# Patient Record
Sex: MALE | Race: WHITE | NOT HISPANIC OR LATINO | Employment: OTHER | URBAN - METROPOLITAN AREA
[De-identification: names, ages, dates, MRNs, and addresses within clinical notes are randomized per-mention and may not be internally consistent; named-entity substitution may affect disease eponyms.]

---

## 2017-03-17 ENCOUNTER — TELEPHONE (OUTPATIENT)
Dept: MEDICAL GROUP | Facility: MEDICAL CENTER | Age: 67
End: 2017-03-17

## 2017-03-17 NOTE — TELEPHONE ENCOUNTER
Future Appointments       Provider Department    4/4/2017 8:00 AM Cleveland Hoover M.D.; [facility]        ANNUAL WELLNESS VISIT PRE-VISIT PLANNING     1.  Reviewed last PCP office visit assessment and plan notes: Yes    2.  If any orders were placed last visit do we have Results/Consult Notes?        •  Labs? Yes Pt. Will get the labs done prior to his chantell.       •  Imaging? Yes        •  Referrals? Yes GI Pt. Plans to schedule an chantell in the near future.    3.  Patient Care Coordination Note was updated with diagnosis information:  Note sent to the PCC note    4.  Patient is due for these Health Maintenance Topics:   Health Maintenance Due   Topic Date Due   • IMM ZOSTER VACCINE  11/06/2008   • IMM PNEUMOCOCCAL 65+ (ADULT) LOW/MEDIUM RISK SERIES (1 of 2 - PCV13) 11/06/2013   • IMM INFLUENZA (1) 09/01/2016   • Annual Wellness Visit  12/03/2016          •  SUE letter was faxed to:  Eye MD for [facility] exam records    5.  Immunizations were updated in [employer] using WebIZ?: Yes       •  Web Iz Recommendations:  Prevnar 13, td, shingles       •  Is patient due for Tdap/Shingles? Yes.  If yes, was patient alerted of copay? Yes    6.  Patient has:       •   Diabetes: No       •   COPD: No       •   CHF: HX: Chest Pains       •   Depression: No    7.  Updated Care Team with [employer] and all specialists?        •   Gait devices, O2, CPAP, etc: no        •   Eye professional: yes       •   Other specialists (GYN, cardiology, endo, etc): N\A    8.  Is patient in need of any refills prior to office visit? No       •    Separate refill encounter created?: N\A    9.  Patient was informed to arrive 15 min prior to their scheduled appointment and bring in their medication bottles? yes    10.  Patient was advised: “This is a free wellness visit. The provider will screen for medical conditions to help you stay healthy. If you have other concerns to address you may be asked to discuss these at a  separate visit or there may be an additional fee.”  Yes

## 2017-03-17 NOTE — Clinical Note
[facility]  Cleveland Hoover M.D.  [address]  [place] [address]  Fax: [phone]   Authorization for Release/Disclosure of   Protected Health Information   Name: XANDER YUAN : 1950 SSN: [id number]   Address: [address] [place] [address]  [place] [address] Phone:    [phone] (home)    I authorize the entity listed below to release/disclose the PHI below to:   [facility]/Cleveland Hoover M.D. and Cleveland Hoover M.D.   Provider or Entity Name:  [facility]   Address: Phone:[phone]    Fax:[phone]   Reason for request: continuity of care   Information to be released:    [  ] LAST COLONOSCOPY,  including any PATH REPORT and follow-up  [  ] LAST FIT/COLOGUARD RESULT [  ] LAST DEXA  [  ] LAST MAMMOGRAM  [  ] LAST PAP  [  ] LAST LABS [  ] RETINA EXAM REPORT  [  ] IMMUNIZATION RECORDS  [  ] Release all info      [  ] Check here and initial the line next to each item to release ALL health information INCLUDING  _____ Care and treatment for drug and / or alcohol abuse  _____ HIV testing, infection status, or AIDS  _____ Genetic Testing    DATES OF SERVICE OR TIME PERIOD TO BE DISCLOSED: _____________  I understand and acknowledge that:  * This Authorization may be revoked at any time by you in writing, except if your health information has already been used or disclosed.  * Your health information that will be used or disclosed as a result of you signing this authorization could be re-disclosed by the recipient. If this occurs, your re-disclosed health information may no longer be protected by State or Federal laws.  * You may refuse to sign this Authorization. Your refusal will not affect your ability to obtain treatment.  * This Authorization becomes effective upon signing and will  on (date) __________.      If no date is indicated, this Authorization will  one (1) year from the signature date.    Name: Xander Yuan    Signature:   Date:          3/17/2017       PLEASE FAX REQUESTED RECORDS BACK TO: [phone]

## 2017-03-28 ENCOUNTER — HOSPITAL ENCOUNTER (OUTPATIENT)
Dept: LAB | Facility: MEDICAL CENTER | Age: 67
End: 2017-03-28
Attending: FAMILY MEDICINE
Payer: MEDICARE

## 2017-03-28 DIAGNOSIS — M79.10 MYALGIA: ICD-10-CM

## 2017-03-28 DIAGNOSIS — R53.83 OTHER FATIGUE: ICD-10-CM

## 2017-03-28 DIAGNOSIS — E78.49 OTHER HYPERLIPIDEMIA: ICD-10-CM

## 2017-03-28 LAB
ALBUMIN SERPL BCP-MCNC: 4.2 G/DL (ref 3.2–4.9)
ALBUMIN/GLOB SERPL: 1.6 G/DL
ALP SERPL-CCNC: 43 U/L (ref 30–99)
ALT SERPL-CCNC: 21 U/L (ref 2–50)
ANION GAP SERPL CALC-SCNC: 7 MMOL/L (ref 0–11.9)
AST SERPL-CCNC: 24 U/L (ref 12–45)
BASOPHILS # BLD AUTO: 0.03 K/UL (ref 0–0.12)
BASOPHILS NFR BLD AUTO: 0.4 % (ref 0–1.8)
BILIRUB SERPL-MCNC: 0.8 MG/DL (ref 0.1–1.5)
BUN SERPL-MCNC: 27 MG/DL (ref 8–22)
CALCIUM SERPL-MCNC: 9.8 MG/DL (ref 8.5–10.5)
CHLORIDE SERPL-SCNC: 104 MMOL/L (ref 96–112)
CHOLEST SERPL-MCNC: 180 MG/DL (ref 100–199)
CK SERPL-CCNC: 118 U/L (ref 0–154)
CO2 SERPL-SCNC: 31 MMOL/L (ref 20–33)
CREAT SERPL-MCNC: 1.15 MG/DL (ref 0.5–1.4)
CRP SERPL HS-MCNC: 0.04 MG/DL (ref 0–0.75)
EOSINOPHIL # BLD: 0.29 K/UL (ref 0–0.51)
EOSINOPHIL NFR BLD AUTO: 4.1 % (ref 0–6.9)
ERYTHROCYTE [DISTWIDTH] IN BLOOD BY AUTOMATED COUNT: 44.1 FL (ref 35.9–50)
ERYTHROCYTE [SEDIMENTATION RATE] IN BLOOD BY WESTERGREN METHOD: 6 MM/HOUR (ref 0–20)
GLOBULIN SER CALC-MCNC: 2.6 G/DL (ref 1.9–3.5)
GLUCOSE SERPL-MCNC: 73 MG/DL (ref 65–99)
HCT VFR BLD AUTO: 47.4 % (ref 42–52)
HDLC SERPL-MCNC: 47 MG/DL
HGB BLD-MCNC: 16.1 G/DL (ref 14–18)
IMM GRANULOCYTES # BLD AUTO: 0.02 K/UL (ref 0–0.11)
IMM GRANULOCYTES NFR BLD AUTO: 0.3 % (ref 0–0.9)
LDLC SERPL CALC-MCNC: 112 MG/DL
LYMPHOCYTES # BLD: 2.52 K/UL (ref 1–4.8)
LYMPHOCYTES NFR BLD AUTO: 35.3 % (ref 22–41)
MCH RBC QN AUTO: 31.4 PG (ref 27–33)
MCHC RBC AUTO-ENTMCNC: 34 G/DL (ref 33.7–35.3)
MCV RBC AUTO: 92.6 FL (ref 81.4–97.8)
MONOCYTES # BLD: 0.57 K/UL (ref 0–0.85)
MONOCYTES NFR BLD AUTO: 8 % (ref 0–13.4)
NEUTROPHILS # BLD: 3.7 K/UL (ref 1.82–7.42)
NEUTROPHILS NFR BLD AUTO: 51.9 % (ref 44–72)
NRBC # BLD AUTO: 0 K/UL
NRBC BLD-RTO: 0 /100 WBC
PLATELET # BLD AUTO: 166 K/UL (ref 164–446)
PMV BLD AUTO: 10.6 FL (ref 9–12.9)
POTASSIUM SERPL-SCNC: 3.9 MMOL/L (ref 3.6–5.5)
PROT SERPL-MCNC: 6.8 G/DL (ref 6–8.2)
RBC # BLD AUTO: 5.12 M/UL (ref 4.7–6.1)
SODIUM SERPL-SCNC: 142 MMOL/L (ref 135–145)
TRIGL SERPL-MCNC: 106 MG/DL (ref 0–149)
TSH SERPL DL<=0.005 MIU/L-ACNC: 3.15 UIU/ML (ref 0.3–3.7)
WBC # BLD AUTO: 7.1 K/UL (ref 4.8–10.8)

## 2017-03-28 PROCEDURE — 36415 COLL VENOUS BLD VENIPUNCTURE: CPT

## 2017-03-28 PROCEDURE — 85025 COMPLETE CBC W/AUTO DIFF WBC: CPT

## 2017-03-28 PROCEDURE — 85652 RBC SED RATE AUTOMATED: CPT

## 2017-03-28 PROCEDURE — 80053 COMPREHEN METABOLIC PANEL: CPT

## 2017-03-28 PROCEDURE — 80061 LIPID PANEL: CPT

## 2017-03-28 PROCEDURE — 84443 ASSAY THYROID STIM HORMONE: CPT

## 2017-03-28 PROCEDURE — 82550 ASSAY OF CK (CPK): CPT

## 2017-03-28 PROCEDURE — 86140 C-REACTIVE PROTEIN: CPT

## 2017-04-04 ENCOUNTER — OFFICE VISIT (OUTPATIENT)
Dept: MEDICAL GROUP | Facility: MEDICAL CENTER | Age: 67
End: 2017-04-04
Payer: MEDICARE

## 2017-04-04 VITALS
DIASTOLIC BLOOD PRESSURE: 68 MMHG | WEIGHT: 181 LBS | HEART RATE: 43 BPM | BODY MASS INDEX: 24.52 KG/M2 | OXYGEN SATURATION: 96 % | TEMPERATURE: 98.4 F | SYSTOLIC BLOOD PRESSURE: 112 MMHG | RESPIRATION RATE: 18 BRPM | HEIGHT: 72 IN

## 2017-04-04 DIAGNOSIS — Z00.00 MEDICARE ANNUAL WELLNESS VISIT, SUBSEQUENT: ICD-10-CM

## 2017-04-04 DIAGNOSIS — G47.09 OTHER INSOMNIA: ICD-10-CM

## 2017-04-04 DIAGNOSIS — N18.30 CHRONIC KIDNEY DISEASE, STAGE III (MODERATE) (HCC): ICD-10-CM

## 2017-04-04 DIAGNOSIS — Z23 NEED FOR PNEUMOCOCCAL VACCINATION: ICD-10-CM

## 2017-04-04 PROCEDURE — G0439 PPPS, SUBSEQ VISIT: HCPCS | Performed by: FAMILY MEDICINE

## 2017-04-04 PROCEDURE — G8510 SCR DEP NEG, NO PLAN REQD: HCPCS | Performed by: FAMILY MEDICINE

## 2017-04-04 ASSESSMENT — PATIENT HEALTH QUESTIONNAIRE - PHQ9
SUM OF ALL RESPONSES TO PHQ QUESTIONS 1-9: 0
CLINICAL INTERPRETATION OF PHQ2 SCORE: 0

## 2017-04-04 NOTE — PROGRESS NOTES
Chief Complaint   Patient presents with   • Annual Wellness Visit         HPI:  Xander is a 66 y.o. male here for Medicare Annual Wellness Visit        Patient Active Problem List    Diagnosis Date Noted   • Impingement syndrome of right shoulder 12/06/2016   • Insomnia 12/03/2015   • Chronic kidney disease, stage III (moderate) 12/03/2015       Current Outpatient Prescriptions   Medication Sig Dispense Refill   • Oxycodone-Acetaminophen (PERCOCET PO) Take 1-2 Tabs by mouth at bedtime as needed (1-2 tab per night prn twice a week.).     • diphenhydramine (SLEEP AID, DIPHENHYDRAMINE,) 25 MG tablet Take 50 mg by mouth at bedtime as needed for Sleep.     • Naproxen Sod-Diphenhydramine (ALEVE PM PO) Take 500 mg by mouth at bedtime as needed.     • ibuprofen (MOTRIN) 600 MG Tab Take 600 mg by mouth every 6 hours as needed.     • Pseudoeph-Doxylamine-DM-APAP (NYQUIL PO) Take  by mouth as needed.     • Ibuprofen-Diphenhydramine Cit (ADVIL PM) 200-38 MG Tab Take  by mouth. Twice a week     • zolpidem (AMBIEN) 10 MG Tab Take 1 Tab by mouth at bedtime as needed for Sleep. 30 Tab 1   • asa/apap/caffeine (EXCEDRIN) 250-250-65 MG Tab Take 1 Tab by mouth every 6 hours as needed for Headache.     • Homeopathic Products (ARNICARE EX) by Apply externally route as needed.       No current facility-administered medications for this visit.        Patient is taking medications as noted in medication list.  Current supplements as per medication list.   Chronic narcotic pain medicines: yes    Allergies: Review of patient's allergies indicates no known allergies.    Current social contact/activities: Pt. Enjoys outdoor activities, pt. Socializes with friends at [employer] and spends time with his wife.      Is patient current with immunizations?  No, due for PREVNAR (PCV13)  and ZOSTAVAX (Shingles). Patient is interested in receiving nothing today.     He  reports that he has never smoked. He has never used smokeless tobacco. He reports that he  drinks alcohol. He reports that he does not use illicit drugs.  Counseling given: Not Answered        DPA/Advanced Directive:  Patient has Advanced Directive and Durable Power of , but it is not on file. Instructed to bring in a copy to scan into their chart.    ROS:    Gait: Uses no assistive device   Ostomy: no   Other tubes: no   Amputations: no   Chronic oxygen use no   Last eye exam 3/2017   Wears hearing aids: no   : Denies incontinence.       Depression Screening    Little interest or pleasure in doing things?  0 - not at all  Feeling down, depressed, or hopeless?  0 - not at all  Patient Health Questionnaire Score: 0  If depressive symptoms identified deferred to follow up visit unless specifically addressed in assessment and plan.    Screening for Cognitive Impairment    Three Minute Recall (banana, sunrise, fence)  3/3 3/3  Draw clock face with all 12 numbers set to the hand to show 10 minutes past 11 o'clock  1 5/5  If cognitive concerns identified deferred to follow up visit unless specifically addressed in assessment and plan.    Fall Risk Assessment    Has the patient had two or more falls in the last year or any fall with injury in the last year?  Yes  If Fall Risk identified deferred to follow up visit unless specifically addressed in assessment and plan.    Safety Assessment    Throw rugs on floor.  Yes  Handrails on all stairs.  Yes  Good lighting in all hallways.  Yes  Difficulty hearing.  Yes, pt. Had a hearing test and will see a specialist for poss. Hearing aids.  Patient counseled about all safety risks that were identified.    Functional Assessment ADLs    Are there any barriers preventing you from cooking for yourself or meeting nutritional needs?  No.    Are there any barriers preventing you from driving safely or obtaining transportation?  No.    Are there any barriers preventing you from using a telephone or calling for help?  No.    Are there any barriers preventing you from  shopping?  No.    Are there any barriers preventing you from taking care of your own finances?  No.    Are there any barriers preventing you from managing your medications?  No.    Are currently engaging any exercise or physical activity?  Yes.  Pt. Surfs, walks, hikes, stretching exercises.     Health Maintenance Summary                IMM ZOSTER VACCINE Overdue 11/6/2008     IMM PNEUMOCOCCAL 65+ (ADULT) LOW/MEDIUM RISK SERIES Overdue 11/6/2013     Annual Wellness Visit Overdue 12/3/2016      Done 12/3/2015 Visit Dx: Medicare annual wellness visit, initial    COLONOSCOPY Next Due 5/13/2017      Done 5/13/2007     IMM DTaP/Tdap/Td Vaccine Next Due 1/23/2025      Done 1/23/2015 Imm Admin: Tdap Vaccine     Patient has more history with this topic...          Patient Care Team:  Cleveland Hoover M.D. as PCP - General (Family Medicine)  Severo Smith MD as Consulting Physician  Clinton Trinh M.D. as Consulting Physician (Orthopaedics)  Joby Mckinnon M.D. as Consulting Physician (Gastroenterology)    Social History   Substance Use Topics   • Smoking status: Never Smoker    • Smokeless tobacco: Never Used   • Alcohol Use: 0.0 oz/week     0 Standard drinks or equivalent per week      Comment: 3 per month     Family History   Problem Relation Age of Onset   • Cancer Mother 72     colon   • Heart Disease Maternal Grandmother    • Diabetes Maternal Grandfather    • Stroke Neg Hx    • Heart Disease Father 82     CHF     He  has a past medical history of Arthritis (12/2/16); Cold (11/30/16); Cataract; and Pain (12/2/16).   Past Surgical History   Procedure Laterality Date   • Finger exploration Right 1965     bone graft R middle   • Hernia repair Bilateral age 7 mo     inguinal   • Tonsillectomy  1958   • Colonoscopy  2007     neg   • Shoulder decompression arthroscopic Right 12/6/2016     Procedure: SHOULDER DECOMPRESSION ARTHROSCOPIC - SUBACROMIAL;  Surgeon: Clinton Trinh M.D.;  Location: SURGERY [facility];  Service:   "  • Clavicle distal excision Right 12/6/2016     Procedure: CLAVICLE DISTAL EXCISION - PARTIAL AND HANCOCK;  Surgeon: Clinton Trinh M.D.;  Location: SURGERY [facility];  Service:            Exam:     Blood pressure 112/68, pulse 43, temperature 36.9 °C (98.4 °F), resp. rate 18, height 1.829 m (6' 0.01\"), weight 82.101 kg (181 lb), SpO2 96 %. Body mass index is 24.54 kg/(m^2).    Hearing good.    Dentition good  Alert, oriented in no acute distress.  Eye contact is good, speech goal directed, affect calm      Assessment and Plan. The following treatment and monitoring plan is recommended:    Chief Complaint   Patient presents with   • Annual Wellness Visit         HISTORY OF PRESENT ILLNESS: Patient is a 66 y.o. male established patient who presents today for the following.  HRA      He  has a past medical history of Arthritis (12/2/16); Cold (11/30/16); Cataract; and Pain (12/2/16).    Patient Active Problem List    Diagnosis Date Noted   • Insomnia 12/03/2015   • Chronic kidney disease, stage III (moderate) 12/03/2015       Allergies:Review of patient's allergies indicates no known allergies.    Current Outpatient Prescriptions   Medication Sig Dispense Refill   • Oxycodone-Acetaminophen (PERCOCET PO) Take 1-2 Tabs by mouth at bedtime as needed (1-2 tab per night prn twice a week.).     • diphenhydramine (SLEEP AID, DIPHENHYDRAMINE,) 25 MG tablet Take 50 mg by mouth at bedtime as needed for Sleep.     • Naproxen Sod-Diphenhydramine (ALEVE PM PO) Take 500 mg by mouth at bedtime as needed.     • ibuprofen (MOTRIN) 600 MG Tab Take 600 mg by mouth every 6 hours as needed.     • Pseudoeph-Doxylamine-DM-APAP (NYQUIL PO) Take  by mouth as needed.     • Ibuprofen-Diphenhydramine Cit (ADVIL PM) 200-38 MG Tab Take  by mouth. Twice a week     • zolpidem (AMBIEN) 10 MG Tab Take 1 Tab by mouth at bedtime as needed for Sleep. 30 Tab 1   • asa/apap/caffeine (EXCEDRIN) 250-250-65 MG Tab Take 1 Tab by mouth every 6 hours as needed " "for Headache.     • Homeopathic Products (ARNICARE EX) by Apply externally route as needed.       No current facility-administered medications for this visit.       Social History   Substance Use Topics   • Smoking status: Never Smoker    • Smokeless tobacco: Never Used   • Alcohol Use: 0.0 oz/week     0 Standard drinks or equivalent per week      Comment: 3 per month       Family History   Problem Relation Age of Onset   • Cancer Mother 72     colon   • Heart Disease Maternal Grandmother    • Diabetes Maternal Grandfather    • Stroke Neg Hx    • Heart Disease Father 82     CHF         Exam:  Blood pressure 112/68, pulse 43, temperature 36.9 °C (98.4 °F), resp. rate 18, height 1.829 m (6' 0.01\"), weight 82.101 kg (181 lb), SpO2 96 %. Body mass index is 24.54 kg/(m^2).  Constitutional:  NAD, well appearing.  HEENT:   NC/AT, PERRLA, EOMI, OP clear, no lymphadenopathy, no thyromegaly.    Cardiovascular: RRR.   No m/r/g. No carotid bruits.       Lungs:   CTAB, no w/r/r, no respiratory distress.  Abdomen: Soft, NT/ND + BS, no masses, no suprapubic tenderness, no hepatomegaly.  Extremities:  2+ DP and radial pulses bilaterally.  No c/c/e.  Skin:  Warm and dry.    Neurologic: Alert & oriented x 3, CN II-XII grossly intact, strength and sensation grossly intact.  No focal deficits noted.  Psychiatric:  Affect normal, mood normal, judgment normal.    Assessment/Plan:     1. Medicare annual wellness visit, subsequent          3. Chronic kidney disease, stage III (moderate)  This is a chronic and stable problem. Labs reviewed with the patient. Discussed avoidance of nephrotoxic drugs. Continue hypertensive management. Continue to monitor.    4. Other insomnia  Stable and chronic. Discussed risks benefits alternatives to this medication. Prescription filled.  Continue to monitor.        Followup: No Follow-up on file.    Please note that this dictation was created using voice recognition software. I have made every reasonable " attempt to correct obvious errors, but I expect that there are errors of grammar and possibly content that I did not discover before finalizing the note.      No diagnosis found.      Services suggested: No services needed at this time  Health Care Screening recommendations as per orders if indicated.  Referrals offered: PT/OT/Nutrition counseling/Behavioral Health/Smoking cessation as per orders if indicated.    Discussion today about general wellness and lifestyle habits:    · Prevent falls and reduce trip hazards; Cautioned about securing or removing rugs.  · Have a working fire alarm and carbon monoxide detector;   · Engage in regular physical activity and social activities       Follow-up: No Follow-up on file.

## 2018-02-28 ENCOUNTER — OFFICE VISIT (OUTPATIENT)
Dept: MEDICAL GROUP | Facility: MEDICAL CENTER | Age: 68
End: 2018-02-28
Payer: MEDICARE

## 2018-02-28 VITALS
OXYGEN SATURATION: 97 % | RESPIRATION RATE: 16 BRPM | WEIGHT: 191 LBS | HEART RATE: 64 BPM | BODY MASS INDEX: 25.87 KG/M2 | HEIGHT: 72 IN | SYSTOLIC BLOOD PRESSURE: 124 MMHG | TEMPERATURE: 98.6 F | DIASTOLIC BLOOD PRESSURE: 60 MMHG

## 2018-02-28 DIAGNOSIS — E78.5 DYSLIPIDEMIA: ICD-10-CM

## 2018-02-28 DIAGNOSIS — Z13.1 SCREENING FOR DIABETES MELLITUS: ICD-10-CM

## 2018-02-28 DIAGNOSIS — Z00.00 HEALTHCARE MAINTENANCE: ICD-10-CM

## 2018-02-28 DIAGNOSIS — I87.2 VENOUS INSUFFICIENCY: ICD-10-CM

## 2018-02-28 DIAGNOSIS — M79.672 PAIN OF LEFT HEEL: ICD-10-CM

## 2018-02-28 DIAGNOSIS — G47.09 OTHER INSOMNIA: ICD-10-CM

## 2018-02-28 DIAGNOSIS — Z13.6 SCREENING FOR ISCHEMIC HEART DISEASE: ICD-10-CM

## 2018-02-28 PROCEDURE — 99214 OFFICE O/P EST MOD 30 MIN: CPT | Performed by: FAMILY MEDICINE

## 2018-02-28 RX ORDER — ASPIRIN 81 MG/1
81 TABLET, CHEWABLE ORAL DAILY
Qty: 100 TAB | Refills: 0
Start: 2018-02-28 | End: 2019-06-14

## 2018-02-28 RX ORDER — SIMVASTATIN 20 MG
20 TABLET ORAL EVERY EVENING
Qty: 90 TAB | Refills: 3 | Status: SHIPPED | OUTPATIENT
Start: 2018-02-28 | End: 2018-02-28 | Stop reason: SDUPTHER

## 2018-02-28 RX ORDER — SIMVASTATIN 20 MG
20 TABLET ORAL EVERY EVENING
Qty: 90 TAB | Refills: 3 | Status: SHIPPED | OUTPATIENT
Start: 2018-02-28 | End: 2019-06-14

## 2018-02-28 ASSESSMENT — PAIN SCALES - GENERAL: PAINLEVEL: NO PAIN

## 2018-02-28 NOTE — ASSESSMENT & PLAN NOTE
Patient has slightly elevated cholesterol with a 10 year cardiovascular risk of 15%. He is not taking any medication for this.

## 2018-02-28 NOTE — ASSESSMENT & PLAN NOTE
Lipids: Ordered.  Fasting Glucose: Ordered.  Hepatitis C Screen: patient declines.  Colonoscopy: Done 5/22/2017 with 10 year recall.    Flu vaccine: Done 2015.  Tdap: Done 2015.

## 2018-02-28 NOTE — ASSESSMENT & PLAN NOTE
Patient describes a one year history of stable, left heel pain. It is worse with walking and particularly bad the first thing in the morning. He says heel inserts do help. There are no other alleviating factors. He denies known trauma.

## 2018-02-28 NOTE — ASSESSMENT & PLAN NOTE
He describes trace bilateral lower extremity edema at the end of the day. He denies orthopnea, paroxysmal dyspnea, chest pain or shortness of breath.

## 2018-02-28 NOTE — PROGRESS NOTES
[facility]  Progress Note  Established Patient    Subjective:   Xander Yuan is a 67 y.o. male here today with a chief complaint of heel pain. The patient is alone.     Pain of left heel  Patient describes a one year history of stable, left heel pain. It is worse with walking and particularly bad the first thing in the morning. He says heel inserts do help. There are no other alleviating factors. He denies known trauma.    Healthcare maintenance  Lipids: Ordered.  Fasting Glucose: Ordered.  Hepatitis C Screen: patient declines.  Colonoscopy: Done 5/22/2017 with 10 year recall.    Flu vaccine: Done 2015.  Tdap: Done 2015.    Dyslipidemia  Patient has slightly elevated cholesterol with a 10 year cardiovascular risk of 15%. He is not taking any medication for this.    Insomnia  The patient describes intermittent insomnia responsive to as needed Benadryl at night.    Venous insufficiency  He describes trace bilateral lower extremity edema at the end of the day. He denies orthopnea, paroxysmal dyspnea, chest pain or shortness of breath.      Current Outpatient Prescriptions on File Prior to Visit   Medication Sig Dispense Refill   • diphenhydramine (SLEEP AID, DIPHENHYDRAMINE,) 25 MG tablet Take 50 mg by mouth at bedtime as needed for Sleep.     • ibuprofen (MOTRIN) 600 MG Tab Take 600 mg by mouth every 6 hours as needed.       No current facility-administered medications on file prior to visit.        Past Medical History:   Diagnosis Date   • Arthritis 12/2/16   • Cataract     bilat   • Insomnia        Allergies: Patient has no known allergies.    Surgical History:  has a past surgical history that includes finger exploration (Right, 1965); hernia repair (Bilateral, age 7 mo); tonsillectomy (1958); colonoscopy (2007); shoulder decompression arthroscopic (Right, 12/6/2016); and clavicle distal excision (Right, 12/6/2016).    Family History: family history includes Cancer (age of onset: 72) in his  mother; Diabetes in his maternal grandfather; Heart Disease in his maternal grandmother; Heart Disease (age of onset: 82) in his father.    Social History:  reports that he has never smoked. He has never used smokeless tobacco. He reports that he drinks alcohol. He reports that he does not use drugs.    ROS: see HPI.        Objective:     Vitals:    02/28/18 1257   BP: 124/60   Pulse: 64   Resp: 16   Temp: 37 °C (98.6 °F)   SpO2: 97%   Weight: 86.6 kg (191 lb)   Height: 1.829 m (6')       Physical Exam:  General: alert in no apparent distress.   Cardio: regular rate and rhythm, no murmurs, rubs or gallops.   Resp: CTAB no w/r/r.   MSK: no TTP over left foot navicular bone, 5th metatarsal, malleoli. No TTP over medial calcaneal tuberosity or achilles; however, there is TTP over mediolateral region.   Ext: trace pedal edema.         Assessment and Plan:     1. Pain of left heel  Character sounds like plantar fasciitis, however, distribution is a little bit unusual. We'll proceed with x-rays and treat as plantar fasciitis. Patient will return if no improvement within one month.  - AAOS handout given.   - DX-ANKLE 3+ VIEWS LEFT; Future  - DX-FOOT-COMPLETE 3+ LEFT; Future    2. Other insomnia  - continue PRN benadryl.     3. Venous insufficiency  The character of the swelling is most c/w this diagnosis. Not c/w CHF.  - discussed elevation, activity and compression hose.     4. Screening for ischemic heart disease  - lipid panel.     5. Screening for diabetes mellitus  - BMP.     6. Dyslipidemia  - COMP METABOLIC PANEL; Future  - LIPID PROFILE; Future  - simvastatin (ZOCOR) 20 MG Tab; Take 1 Tab by mouth every evening.  Dispense: 90 Tab; Refill: 3    7. Healthcare maintenance  Discussed risks and benefits of aspirin and statin. Patient will like to proceed with treatment.  - aspirin (ASA) 81 MG Chew Tab chewable tablet; Take 1 Tab by mouth every day.  Dispense: 100 Tab; Refill: 0  - simvastatin (ZOCOR) 20 MG Tab; Take 1  Tab by mouth every evening.  Dispense: 90 Tab; Refill: 3        Followup: Return in about 6 months (around 8/28/2018), or if symptoms worsen or fail to improve.

## 2018-03-12 ENCOUNTER — HOSPITAL ENCOUNTER (OUTPATIENT)
Dept: RADIOLOGY | Facility: MEDICAL CENTER | Age: 68
End: 2018-03-12
Attending: FAMILY MEDICINE
Payer: MEDICARE

## 2018-03-12 DIAGNOSIS — M79.672 PAIN OF LEFT HEEL: ICD-10-CM

## 2018-03-12 PROCEDURE — 73610 X-RAY EXAM OF ANKLE: CPT | Mod: LT

## 2018-03-12 PROCEDURE — 73630 X-RAY EXAM OF FOOT: CPT | Mod: LT

## 2018-04-13 ENCOUNTER — HOSPITAL ENCOUNTER (OUTPATIENT)
Dept: LAB | Facility: MEDICAL CENTER | Age: 68
End: 2018-04-13
Attending: FAMILY MEDICINE
Payer: MEDICARE

## 2018-04-13 DIAGNOSIS — E78.5 DYSLIPIDEMIA: ICD-10-CM

## 2018-04-13 LAB
ALBUMIN SERPL BCP-MCNC: 4.1 G/DL (ref 3.2–4.9)
ALBUMIN/GLOB SERPL: 1.5 G/DL
ALP SERPL-CCNC: 39 U/L (ref 30–99)
ALT SERPL-CCNC: 29 U/L (ref 2–50)
ANION GAP SERPL CALC-SCNC: 8 MMOL/L (ref 0–11.9)
AST SERPL-CCNC: 28 U/L (ref 12–45)
BILIRUB SERPL-MCNC: 1.1 MG/DL (ref 0.1–1.5)
BUN SERPL-MCNC: 29 MG/DL (ref 8–22)
CALCIUM SERPL-MCNC: 9.7 MG/DL (ref 8.5–10.5)
CHLORIDE SERPL-SCNC: 105 MMOL/L (ref 96–112)
CHOLEST SERPL-MCNC: 101 MG/DL (ref 100–199)
CO2 SERPL-SCNC: 28 MMOL/L (ref 20–33)
CREAT SERPL-MCNC: 1.11 MG/DL (ref 0.5–1.4)
GLOBULIN SER CALC-MCNC: 2.8 G/DL (ref 1.9–3.5)
GLUCOSE SERPL-MCNC: 78 MG/DL (ref 65–99)
HDLC SERPL-MCNC: 45 MG/DL
LDLC SERPL CALC-MCNC: 42 MG/DL
POTASSIUM SERPL-SCNC: 4.3 MMOL/L (ref 3.6–5.5)
PROT SERPL-MCNC: 6.9 G/DL (ref 6–8.2)
SODIUM SERPL-SCNC: 141 MMOL/L (ref 135–145)
TRIGL SERPL-MCNC: 71 MG/DL (ref 0–149)

## 2018-04-13 PROCEDURE — 80061 LIPID PANEL: CPT

## 2018-04-13 PROCEDURE — 80053 COMPREHEN METABOLIC PANEL: CPT

## 2018-04-13 PROCEDURE — 36415 COLL VENOUS BLD VENIPUNCTURE: CPT

## 2018-04-18 DIAGNOSIS — M18.10 OSTEOARTHRITIS OF THUMB, UNSPECIFIED LATERALITY: ICD-10-CM

## 2018-05-15 ENCOUNTER — APPOINTMENT (RX ONLY)
Dept: URBAN - METROPOLITAN AREA CLINIC 4 | Facility: CLINIC | Age: 68
Setting detail: DERMATOLOGY
End: 2018-05-15

## 2018-05-15 DIAGNOSIS — D18.0 HEMANGIOMA: ICD-10-CM

## 2018-05-15 DIAGNOSIS — L82.1 OTHER SEBORRHEIC KERATOSIS: ICD-10-CM

## 2018-05-15 DIAGNOSIS — L57.0 ACTINIC KERATOSIS: ICD-10-CM

## 2018-05-15 DIAGNOSIS — L81.4 OTHER MELANIN HYPERPIGMENTATION: ICD-10-CM

## 2018-05-15 DIAGNOSIS — D22 MELANOCYTIC NEVI: ICD-10-CM

## 2018-05-15 PROBLEM — D22.72 MELANOCYTIC NEVI OF LEFT LOWER LIMB, INCLUDING HIP: Status: ACTIVE | Noted: 2018-05-15

## 2018-05-15 PROBLEM — D22.62 MELANOCYTIC NEVI OF LEFT UPPER LIMB, INCLUDING SHOULDER: Status: ACTIVE | Noted: 2018-05-15

## 2018-05-15 PROBLEM — D22.61 MELANOCYTIC NEVI OF RIGHT UPPER LIMB, INCLUDING SHOULDER: Status: ACTIVE | Noted: 2018-05-15

## 2018-05-15 PROBLEM — D22.5 MELANOCYTIC NEVI OF TRUNK: Status: ACTIVE | Noted: 2018-05-15

## 2018-05-15 PROBLEM — D18.01 HEMANGIOMA OF SKIN AND SUBCUTANEOUS TISSUE: Status: ACTIVE | Noted: 2018-05-15

## 2018-05-15 PROBLEM — D22.71 MELANOCYTIC NEVI OF RIGHT LOWER LIMB, INCLUDING HIP: Status: ACTIVE | Noted: 2018-05-15

## 2018-05-15 PROCEDURE — ? COUNSELING

## 2018-05-15 PROCEDURE — 17000 DESTRUCT PREMALG LESION: CPT

## 2018-05-15 PROCEDURE — ? LIQUID NITROGEN

## 2018-05-15 PROCEDURE — ? SUNSCREEN RECOMMENDATIONS

## 2018-05-15 PROCEDURE — 17003 DESTRUCT PREMALG LES 2-14: CPT

## 2018-05-15 PROCEDURE — 99202 OFFICE O/P NEW SF 15 MIN: CPT | Mod: 25

## 2018-05-15 ASSESSMENT — LOCATION SIMPLE DESCRIPTION DERM
LOCATION SIMPLE: RIGHT HAND
LOCATION SIMPLE: ABDOMEN
LOCATION SIMPLE: LEFT CHEEK
LOCATION SIMPLE: RIGHT LOWER BACK
LOCATION SIMPLE: LEFT FOREARM
LOCATION SIMPLE: RIGHT EAR
LOCATION SIMPLE: UPPER BACK
LOCATION SIMPLE: LEFT UPPER BACK
LOCATION SIMPLE: LEFT THIGH
LOCATION SIMPLE: RIGHT POSTERIOR UPPER ARM
LOCATION SIMPLE: LEFT EAR
LOCATION SIMPLE: RIGHT THIGH
LOCATION SIMPLE: LEFT HAND
LOCATION SIMPLE: LEFT POSTERIOR UPPER ARM
LOCATION SIMPLE: RIGHT CHEEK
LOCATION SIMPLE: LEFT ANTERIOR NECK
LOCATION SIMPLE: RIGHT FOREARM

## 2018-05-15 ASSESSMENT — LOCATION ZONE DERM
LOCATION ZONE: EAR
LOCATION ZONE: NECK
LOCATION ZONE: HAND
LOCATION ZONE: FACE
LOCATION ZONE: TRUNK
LOCATION ZONE: ARM
LOCATION ZONE: LEG

## 2018-05-15 ASSESSMENT — LOCATION DETAILED DESCRIPTION DERM
LOCATION DETAILED: LEFT ULNAR DORSAL HAND
LOCATION DETAILED: LEFT SUPERIOR MEDIAL UPPER BACK
LOCATION DETAILED: RIGHT DISTAL POSTERIOR UPPER ARM
LOCATION DETAILED: PERIUMBILICAL SKIN
LOCATION DETAILED: RIGHT RIB CAGE
LOCATION DETAILED: RIGHT CENTRAL MALAR CHEEK
LOCATION DETAILED: SUPERIOR THORACIC SPINE
LOCATION DETAILED: LEFT CENTRAL MALAR CHEEK
LOCATION DETAILED: RIGHT ANTERIOR PROXIMAL THIGH
LOCATION DETAILED: RIGHT SUPERIOR HELIX
LOCATION DETAILED: LEFT PROXIMAL POSTERIOR UPPER ARM
LOCATION DETAILED: RIGHT RADIAL DORSAL HAND
LOCATION DETAILED: LEFT SUPERIOR CRUS OF ANTIHELIX
LOCATION DETAILED: LEFT PROXIMAL DORSAL FOREARM
LOCATION DETAILED: RIGHT SUPERIOR MEDIAL MIDBACK
LOCATION DETAILED: LEFT INFERIOR ANTERIOR NECK
LOCATION DETAILED: RIGHT PROXIMAL DORSAL FOREARM
LOCATION DETAILED: RIGHT SUPERIOR CRUS OF ANTIHELIX
LOCATION DETAILED: LEFT ANTERIOR PROXIMAL THIGH
LOCATION DETAILED: LEFT SUPERIOR HELIX

## 2018-05-15 NOTE — PROCEDURE: LIQUID NITROGEN
Consent: The patient's consent was obtained including but not limited to risks of crusting, scabbing, blistering, scarring, darker or lighter pigmentary change, recurrence, incomplete removal and infection.
Post-Care Instructions: I reviewed with the patient in detail post-care instructions. Patient is to wear sunprotection, and avoid picking at any of the treated lesions. Pt may apply Vaseline to crusted or scabbing areas.
Render Post-Care Instructions In Note?: no
Duration Of Freeze Thaw-Cycle (Seconds): 3
Number Of Freeze-Thaw Cycles: 2 freeze-thaw cycles
Detail Level: Simple

## 2018-05-31 ENCOUNTER — PATIENT OUTREACH (OUTPATIENT)
Dept: HEALTH INFORMATION MANAGEMENT | Facility: OTHER | Age: 68
End: 2018-05-31

## 2018-05-31 NOTE — PROGRESS NOTES
Outcome: no answer    Please transfer to Patient Outreach Team at [phone] when patient returns call.        Attempt # 1

## 2018-10-23 ENCOUNTER — PATIENT OUTREACH (OUTPATIENT)
Dept: HEALTH INFORMATION MANAGEMENT | Facility: OTHER | Age: 68
End: 2018-10-23

## 2018-10-23 ENCOUNTER — TELEPHONE (OUTPATIENT)
Dept: HEALTH INFORMATION MANAGEMENT | Facility: OTHER | Age: 68
End: 2018-10-23

## 2018-10-23 NOTE — TELEPHONE ENCOUNTER
Good morning Dr. Pascual,    This patient has an appointment with you on December 20th. He would like to know if there are any labs for him to complete prior to this visit. Please review.    Thank you,    Sharri

## 2018-10-23 NOTE — PROGRESS NOTES
1. Attempt #:1    2. [facility] Verified: yes    3. Verify PCP: yes    4. Review Care Team: yes    5. WebIZ Checked & Epic Updated: Yes  · WebIZ Recommendations: SHINGRIX (Shingles)  · Is patient due for Tdap? NO  · Is patient due for Shingles? YES. Patient was not notified of copay/out of pocket cost. out of stock    6. Reviewed/Updated the following with patient:       •   Communication Preference Obtained? YES       •   Preferred Pharmacy? YES       •   Preferred Lab? YES       •   Family History (document living status of immediate family members and if + hx of cancer, diabetes, hypertension, hyperlipidemia, heart attack, stroke) NO pt would like to do at appt    7. Annual Wellness Visit Scheduling  · Scheduling Status:Scheduled       9. Care Gap Scheduling (Attempt to Schedule EACH Overdue Care Gap!)     Health Maintenance Due   Topic Date Due   • IMM ZOSTER VACCINES (1 of 2) 11/06/1998   • Annual Wellness Visit  04/05/2018        Scheduled patient for Annual Wellness Visit     10. [employer] Activation: already active    11. [employer] Anu: no    12. Virtual Visits: no    13. Opt In to Text Messages: yes    14. Patient was advised: “This is a free wellness visit. The provider will screen for medical conditions to help you stay healthy. If you have other concerns to address you may be asked to discuss these at a separate visit or there may be an additional fee.”     15. Patient was informed to arrive 15 min prior to their scheduled appointment and bring in their medication bottles.

## 2018-12-18 DIAGNOSIS — R53.83 LETHARGY: ICD-10-CM

## 2019-06-05 ENCOUNTER — TELEPHONE (OUTPATIENT)
Dept: MEDICAL GROUP | Facility: MEDICAL CENTER | Age: 69
End: 2019-06-05

## 2019-06-05 NOTE — TELEPHONE ENCOUNTER
ESTABLISHED PATIENT PRE-VISIT PLANNING     Patient was NOT contacted to complete PVP.     Note: Patient will not be contacted if there is no indication to call.     1.  Reviewed notes from the last few office visits within the medical group: Yes    2.  If any orders were placed at last visit or intended to be done for this visit (i.e. 6 mos follow-up), do we have Results/Consult Notes?        •  Labs - Labs ordered, completed on 04/13/2018 and results are in chart.   Note: If patient appointment is for lab review and patient did not complete labs, check with provider if OK to reschedule patient until labs completed.       •  Imaging - Imaging ordered, completed and results are in chart.       •  Referrals - No referrals were ordered at last office visit.    3. Is this appointment scheduled as a Hospital Follow-Up? No    4.  Immunizations were updated in Epic using WebIZ?: Epic matches WebIZ       •  Web Iz Recommendations: PREVNAR (PCV13) , VARICELLA (Chicken Pox)  and SHINGRIX (Shingles)    5.  Patient is due for the following Health Maintenance Topics:   Health Maintenance Due   Topic Date Due   • HEPATITIS C SCREENING  1950   • IMM ZOSTER VACCINES (1 of 2) 11/06/1998   • IMM PNEUMOCOCCAL 65+ (ADULT) LOW/MEDIUM RISK SERIES (1 of 2 - PCV13) 11/06/2013   • Annual Wellness Visit  04/05/2018       6. Orders for overdue Health Maintenance topics pended in Pre-Charting? NO    7.  AHA (MDX) form printed for Provider? YES    8.  Patient was NOT informed to arrive 15 min prior to their scheduled appointment and bring in their medication bottles.

## 2019-06-13 ENCOUNTER — HOSPITAL ENCOUNTER (OUTPATIENT)
Dept: LAB | Facility: MEDICAL CENTER | Age: 69
End: 2019-06-13
Attending: FAMILY MEDICINE
Payer: MEDICARE

## 2019-06-13 DIAGNOSIS — Z00.00 HEALTHCARE MAINTENANCE: ICD-10-CM

## 2019-06-13 DIAGNOSIS — R53.83 LETHARGY: ICD-10-CM

## 2019-06-13 DIAGNOSIS — Z13.6 SCREENING FOR ISCHEMIC HEART DISEASE: ICD-10-CM

## 2019-06-13 LAB
ALBUMIN SERPL BCP-MCNC: 4.2 G/DL (ref 3.2–4.9)
ALBUMIN/GLOB SERPL: 1.4 G/DL
ALP SERPL-CCNC: 46 U/L (ref 30–99)
ALT SERPL-CCNC: 22 U/L (ref 2–50)
ANION GAP SERPL CALC-SCNC: 8 MMOL/L (ref 0–11.9)
AST SERPL-CCNC: 26 U/L (ref 12–45)
BILIRUB SERPL-MCNC: 1 MG/DL (ref 0.1–1.5)
BUN SERPL-MCNC: 27 MG/DL (ref 8–22)
CALCIUM SERPL-MCNC: 9.8 MG/DL (ref 8.5–10.5)
CHLORIDE SERPL-SCNC: 108 MMOL/L (ref 96–112)
CO2 SERPL-SCNC: 26 MMOL/L (ref 20–33)
CREAT SERPL-MCNC: 1.27 MG/DL (ref 0.5–1.4)
GLOBULIN SER CALC-MCNC: 2.9 G/DL (ref 1.9–3.5)
GLUCOSE SERPL-MCNC: 85 MG/DL (ref 65–99)
HCT VFR BLD AUTO: 46.3 % (ref 42–52)
HGB BLD-MCNC: 15.5 G/DL (ref 14–18)
POTASSIUM SERPL-SCNC: 4.4 MMOL/L (ref 3.6–5.5)
PROT SERPL-MCNC: 7.1 G/DL (ref 6–8.2)
SODIUM SERPL-SCNC: 142 MMOL/L (ref 135–145)
TESTOST SERPL-MCNC: 436 NG/DL (ref 175–781)
TSH SERPL DL<=0.005 MIU/L-ACNC: 1.33 UIU/ML (ref 0.38–5.33)

## 2019-06-13 PROCEDURE — 84403 ASSAY OF TOTAL TESTOSTERONE: CPT

## 2019-06-13 PROCEDURE — 36415 COLL VENOUS BLD VENIPUNCTURE: CPT

## 2019-06-13 PROCEDURE — 85018 HEMOGLOBIN: CPT

## 2019-06-13 PROCEDURE — 80053 COMPREHEN METABOLIC PANEL: CPT

## 2019-06-13 PROCEDURE — 84443 ASSAY THYROID STIM HORMONE: CPT

## 2019-06-13 PROCEDURE — 85014 HEMATOCRIT: CPT

## 2019-06-13 PROCEDURE — 80061 LIPID PANEL: CPT

## 2019-06-14 ENCOUNTER — OFFICE VISIT (OUTPATIENT)
Dept: MEDICAL GROUP | Facility: MEDICAL CENTER | Age: 69
End: 2019-06-14
Payer: MEDICARE

## 2019-06-14 ENCOUNTER — TELEPHONE (OUTPATIENT)
Dept: MEDICAL GROUP | Facility: MEDICAL CENTER | Age: 69
End: 2019-06-14

## 2019-06-14 VITALS
HEIGHT: 72 IN | BODY MASS INDEX: 25.22 KG/M2 | OXYGEN SATURATION: 99 % | SYSTOLIC BLOOD PRESSURE: 94 MMHG | TEMPERATURE: 97.4 F | HEART RATE: 44 BPM | DIASTOLIC BLOOD PRESSURE: 54 MMHG | WEIGHT: 186.2 LBS | RESPIRATION RATE: 16 BRPM

## 2019-06-14 DIAGNOSIS — L03.032 PARONYCHIA OF TOE OF LEFT FOOT: ICD-10-CM

## 2019-06-14 DIAGNOSIS — Z13.6 SCREENING FOR ISCHEMIC HEART DISEASE: ICD-10-CM

## 2019-06-14 DIAGNOSIS — G47.09 OTHER INSOMNIA: ICD-10-CM

## 2019-06-14 DIAGNOSIS — K40.90 RIGHT INGUINAL HERNIA: ICD-10-CM

## 2019-06-14 DIAGNOSIS — Z00.00 HEALTHCARE MAINTENANCE: ICD-10-CM

## 2019-06-14 DIAGNOSIS — N18.30 STAGE 3 CHRONIC KIDNEY DISEASE (HCC): ICD-10-CM

## 2019-06-14 DIAGNOSIS — R53.83 LETHARGY: ICD-10-CM

## 2019-06-14 DIAGNOSIS — Z12.5 SCREENING FOR PROSTATE CANCER: ICD-10-CM

## 2019-06-14 PROBLEM — M79.672 PAIN OF LEFT HEEL: Status: RESOLVED | Noted: 2018-02-28 | Resolved: 2019-06-14

## 2019-06-14 LAB
CHOLEST SERPL-MCNC: 150 MG/DL (ref 100–199)
HDLC SERPL-MCNC: 38 MG/DL
LDLC SERPL CALC-MCNC: 69 MG/DL
TRIGL SERPL-MCNC: 213 MG/DL (ref 0–149)

## 2019-06-14 PROCEDURE — 8041 PR SCP AHA: Performed by: FAMILY MEDICINE

## 2019-06-14 PROCEDURE — 99214 OFFICE O/P EST MOD 30 MIN: CPT | Mod: 25 | Performed by: FAMILY MEDICINE

## 2019-06-14 PROCEDURE — G0439 PPPS, SUBSEQ VISIT: HCPCS | Performed by: FAMILY MEDICINE

## 2019-06-14 RX ORDER — CHOLECALCIFEROL (VITAMIN D3) 25 MCG
TABLET ORAL
Qty: 90 TAB | Refills: 3 | Status: SHIPPED
Start: 2019-06-14 | End: 2023-09-08

## 2019-06-14 ASSESSMENT — PATIENT HEALTH QUESTIONNAIRE - PHQ9: CLINICAL INTERPRETATION OF PHQ2 SCORE: 0

## 2019-06-14 ASSESSMENT — ENCOUNTER SYMPTOMS: GENERAL WELL-BEING: GOOD

## 2019-06-14 ASSESSMENT — ACTIVITIES OF DAILY LIVING (ADL): BATHING_REQUIRES_ASSISTANCE: 0

## 2019-06-14 NOTE — ASSESSMENT & PLAN NOTE
3 years ago the patient developed a recurrent infection in his left great toe near the proximal nail fold that is moderate in severity.  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  Since then, he has had intermittent episodes of a draining abscess.  He has been treated with incision and drainage and antibiotics in the past, all with success, however, it keeps coming back.  He also saw a podiatrist in the past but this did not really help him too much.

## 2019-06-14 NOTE — ASSESSMENT & PLAN NOTE
1/2 years ago the patient noticed an nonpainful right inguinal bulge worse when he stands up.  He states that he had an inguinal hernia when he was a child.  His symptoms are mild.

## 2019-06-14 NOTE — ASSESSMENT & PLAN NOTE
Patient describes sleep onset and sleep maintenance insomnia.  This is been a problem for several years.  It is moderate in severity.  The patient uses Benadryl and this helps.

## 2019-06-14 NOTE — TELEPHONE ENCOUNTER
Please call the lab and see if we can add on lipid panel and PSA to labs drawn 6/13/19. Order placed.

## 2019-06-14 NOTE — ASSESSMENT & PLAN NOTE
Lipids: Ordered.  Fasting Glucose: 1/2019 and normal.  Hepatitis C Screen: patient declines.  Colonoscopy: Done 5/22/2017 with 10 year recall.    Prevnar: recommended, patient declines.   Tdap: Done 2015.

## 2019-06-14 NOTE — ASSESSMENT & PLAN NOTE
Patient complains of some generalized fatigue.  We have done testosterone testing, thyroid testing and anemia testing.  All this is normal.  He denies snoring.  He is mild in severity.  He cannot identify any associated factors, beyond insomnia discussed elsewhere.  There are no known aggravating factors.  He denies depression.

## 2019-06-18 ENCOUNTER — TELEPHONE (OUTPATIENT)
Dept: MEDICAL GROUP | Facility: MEDICAL CENTER | Age: 69
End: 2019-06-18

## 2019-06-18 NOTE — TELEPHONE ENCOUNTER
ESTABLISHED PATIENT PRE-VISIT PLANNING     Patient was NOT contacted to complete PVP.     Note: Patient will not be contacted if there is no indication to call.     1.  Reviewed notes from the last few office visits within the medical group: Yes    2.  If any orders were placed at last visit or intended to be done for this visit (i.e. 6 mos follow-up), do we have Results/Consult Notes?        •  Labs - Labs ordered, but not to be completed until Future.   Note: If patient appointment is for lab review and patient did not complete labs, check with provider if OK to reschedule patient until labs completed.       •  Imaging - Imaging was not ordered at last office visit.       •  Referrals - No referrals were ordered at last office visit.    3. Is this appointment scheduled as a Hospital Follow-Up? No    4.  Immunizations were updated in Epic using WebIZ?: Epic matches WebIZ       •  Web Iz Recommendations: PREVNAR (PCV13) , VARICELLA (Chicken Pox)  and SHINGRIX (Shingles)    5.  Patient is due for the following Health Maintenance Topics:   Health Maintenance Due   Topic Date Due   • HEPATITIS C SCREENING  1950   • IMM ZOSTER VACCINES (1 of 2) 11/06/1998   • IMM PNEUMOCOCCAL 65+ (ADULT) LOW/MEDIUM RISK SERIES (1 of 2 - PCV13) 11/06/2013   • Annual Wellness Visit  04/05/2018       6. Orders for overdue Health Maintenance topics pended in Pre-Charting? NO    7.  AHA (MDX) form printed for Provider? YES, will double check before appointment.    8.  Patient was NOT informed to arrive 15 min prior to their scheduled appointment and bring in their medication bottles.

## 2019-06-25 ENCOUNTER — OFFICE VISIT (OUTPATIENT)
Dept: MEDICAL GROUP | Facility: MEDICAL CENTER | Age: 69
End: 2019-06-25
Payer: MEDICARE

## 2019-06-25 VITALS
RESPIRATION RATE: 18 BRPM | DIASTOLIC BLOOD PRESSURE: 60 MMHG | HEART RATE: 55 BPM | WEIGHT: 186.4 LBS | BODY MASS INDEX: 25.25 KG/M2 | TEMPERATURE: 97.5 F | SYSTOLIC BLOOD PRESSURE: 102 MMHG | HEIGHT: 72 IN | OXYGEN SATURATION: 94 %

## 2019-06-25 DIAGNOSIS — E78.5 DYSLIPIDEMIA: ICD-10-CM

## 2019-06-25 DIAGNOSIS — R68.89 ABNORMAL ANKLE BRACHIAL INDEX (ABI): ICD-10-CM

## 2019-06-25 DIAGNOSIS — G47.09 OTHER INSOMNIA: ICD-10-CM

## 2019-06-25 DIAGNOSIS — I77.89 OTHER SPECIFIED DISORDERS OF ARTERIES AND ARTERIOLES (HCC): ICD-10-CM

## 2019-06-25 DIAGNOSIS — R53.83 LETHARGY: ICD-10-CM

## 2019-06-25 PROCEDURE — 99214 OFFICE O/P EST MOD 30 MIN: CPT | Performed by: FAMILY MEDICINE

## 2019-06-25 NOTE — ASSESSMENT & PLAN NOTE
Patient is now interested in seeing a sleep doctor for daytime somnolence.  He also suffers from insomnia

## 2019-06-25 NOTE — PROGRESS NOTES
[facility]  Progress Note  Established Patient    Subjective:   Xander Yuan is a 68 y.o. male here today with a chief complaint of abnormal NIURKA. The patient is alone.     Abnormal ankle brachial index (NIURKA)  Patient brings in a [employer] screening results showing an abnormal NIURKA.    Dyslipidemia  Patient has dyslipidemia with a 10-year ASCVD risk of 12%.    Lethargy  Patient continues to complain of lethargy.  He has only been intermittently taking a multivitamin and B complex.  He is now open to seeing a sleep doctor for daytime somnolence.    Insomnia  Patient is now interested in seeing a sleep doctor for daytime somnolence.  He also suffers from insomnia      Current Outpatient Prescriptions on File Prior to Visit   Medication Sig Dispense Refill   • Melatonin CR 3 MG Tab CR 1 tablet PO nightly 90 Tab 3   • diphenhydramine (SLEEP AID, DIPHENHYDRAMINE,) 25 MG tablet Take 50 mg by mouth at bedtime as needed for Sleep.     • ibuprofen (MOTRIN) 600 MG Tab Take 600 mg by mouth every 6 hours as needed.       No current facility-administered medications on file prior to visit.        Past Medical History:   Diagnosis Date   • Arthritis 12/2/16   • Cataract     bilat   • Insomnia        Allergies: Patient has no known allergies.    Surgical History:  has a past surgical history that includes finger exploration (Right, 1965); hernia repair (Bilateral, age 7 mo); tonsillectomy (1958); colonoscopy (2007); shoulder decompression arthroscopic (Right, 12/6/2016); and clavicle distal excision (Right, 12/6/2016).    Family History: family history includes Cancer (age of onset: 72) in his mother; Diabetes in his maternal grandfather and paternal grandfather; Heart Disease in his maternal grandmother; Heart Disease (age of onset: 82) in his father.    Social History:  reports that he has never smoked. He has never used smokeless tobacco. He reports that he drinks alcohol. He reports that he does not use  drugs.    ROS: no fever or nausea.        Objective:     Vitals:    06/25/19 1336   BP: 102/60   BP Location: Left arm   Patient Position: Sitting   BP Cuff Size: Adult   Pulse: (!) 55   Resp: 18   Temp: 36.4 °C (97.5 °F)   TempSrc: Temporal   SpO2: 94%   Weight: 84.6 kg (186 lb 6.4 oz)   Height: 1.829 m (6')       Physical Exam:  General: alert in no apparent distress.   Cardio: 2+ DP pulses bilaterally        Assessment and Plan:     1. Abnormal ankle brachial index (NIURKA)  Noted on [employer] screening, exam normal.   - US-EXTREMITY ARTERY LOWER BILAT W/NIURKA (COMBO); Future    2. 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 Lethargy  - REFERRAL TO SLEEP STUDIES    4. Dyslipidemia  - recommended bASA and statin, patient declines.     5. Other insomnia  - sleep consult.         Followup: Return if symptoms worsen or fail to improve.

## 2019-06-25 NOTE — ASSESSMENT & PLAN NOTE
Patient continues to complain of lethargy.  He has only been intermittently taking a multivitamin and B complex.  He is now open to seeing a sleep doctor for daytime somnolence.

## 2019-07-04 ENCOUNTER — HOSPITAL ENCOUNTER (OUTPATIENT)
Dept: RADIOLOGY | Facility: MEDICAL CENTER | Age: 69
End: 2019-07-04
Attending: FAMILY MEDICINE
Payer: MEDICARE

## 2019-07-04 DIAGNOSIS — R68.89 ABNORMAL ANKLE BRACHIAL INDEX (ABI): ICD-10-CM

## 2019-07-04 DIAGNOSIS — I77.89 OTHER SPECIFIED DISORDERS OF ARTERIES AND ARTERIOLES (HCC): ICD-10-CM

## 2019-07-04 PROCEDURE — 93922 UPR/L XTREMITY ART 2 LEVELS: CPT

## 2019-07-10 ENCOUNTER — TELEPHONE (OUTPATIENT)
Dept: MEDICAL GROUP | Facility: MEDICAL CENTER | Age: 69
End: 2019-07-10

## 2019-08-26 ENCOUNTER — TELEPHONE (OUTPATIENT)
Dept: MEDICAL GROUP | Facility: MEDICAL CENTER | Age: 69
End: 2019-08-26

## 2019-08-26 DIAGNOSIS — M18.12 ARTHRITIS OF CARPOMETACARPAL (CMC) JOINT OF LEFT THUMB: ICD-10-CM

## 2019-08-26 NOTE — TELEPHONE ENCOUNTER
----- Message from Xander Yuan sent at 8/25/2019  1:55 PM PDT -----  Regarding: Non-Urgent Medical Question  Contact: [phone]  Dr. Pascual, I've requested that my chart regarding surgery on my left thumb be sent to you for evaluation from the office of the surgeon Dr. Ke Herzog at [facility]. Is that acceptable? I've been given the name of Dr. Franklin Lugo at [facility] for an independent evaluation of the surgery outcome but will need a referral from my primary doctor. Landen ORNELAS

## 2019-08-27 NOTE — TELEPHONE ENCOUNTER
"It looks like you can review his chart through care everywhere. \"Arthritis of carpometacarpal (CMC) joint of left thumb\" is what I saw in chart.  "

## 2019-08-27 NOTE — TELEPHONE ENCOUNTER
I am happy to sign the referral - we do not have surgical consult notes in media yet so please call patient and ask him why he had surgery on the left thumb / what procedure he had as I need a diagnostic code for insurance. I don't see any notation under surgical history for surgery on the thumb. Thanks!

## 2019-10-08 ENCOUNTER — HOSPITAL ENCOUNTER (OUTPATIENT)
Dept: RADIOLOGY | Facility: MEDICAL CENTER | Age: 69
End: 2019-10-08

## 2020-06-12 ENCOUNTER — TELEPHONE (OUTPATIENT)
Dept: MEDICAL GROUP | Facility: PHYSICIAN GROUP | Age: 70
End: 2020-06-12

## 2020-06-12 NOTE — TELEPHONE ENCOUNTER
ESTABLISHED PATIENT PRE-VISIT PLANNING     Patient was NOT contacted to complete PVP.     Note: Patient will not be contacted if there is no indication to call.     1.  Reviewed notes from the last few office visits within the medical group: Yes    2.  If any orders were placed at last visit or intended to be done for this visit (i.e. 6 mos follow-up), do we have Results/Consult Notes?        •  Labs - Labs were not ordered at last office visit.   Note: If patient appointment is for lab review and patient did not complete labs, check with provider if OK to reschedule patient until labs completed.       •  Imaging - Imaging was not ordered at last office visit.       •  Referrals - No referrals were ordered at last office visit.    3. Is this appointment scheduled as a Hospital Follow-Up? No    4.  Immunizations were updated in Epic using WebIZ?: Epic matches WebIZ       •  Web Iz Recommendations: PNEUMOVAX (PPSV23) and SHINGRIX (Shingles)    5.  Patient is due for the following Health Maintenance Topics:   Health Maintenance Due   Topic Date Due   • HEPATITIS C SCREENING  1950   • IMM ZOSTER VACCINES (1 of 2) 11/06/1998   • IMM PNEUMOCOCCAL VACCINE: 65+ Years (1 of 2 - PCV13) 11/06/2013   • Annual Wellness Visit  04/05/2018       - Patient already has appointment scheduled for Annual Wellness Visit (AWV).    6. Orders for overdue Health Maintenance topics pended in Pre-Charting? N\A    7.  AHA (MDX) form printed for Provider? YES    8.  Patient was NOT informed to arrive 15 min prior to their scheduled appointment and bring in their medication bottles.

## 2020-06-15 ENCOUNTER — OFFICE VISIT (OUTPATIENT)
Dept: MEDICAL GROUP | Facility: MEDICAL CENTER | Age: 70
End: 2020-06-15
Payer: MEDICARE

## 2020-06-15 VITALS
RESPIRATION RATE: 18 BRPM | WEIGHT: 187.6 LBS | OXYGEN SATURATION: 96 % | SYSTOLIC BLOOD PRESSURE: 104 MMHG | HEIGHT: 70 IN | BODY MASS INDEX: 26.86 KG/M2 | TEMPERATURE: 97.8 F | HEART RATE: 44 BPM | DIASTOLIC BLOOD PRESSURE: 64 MMHG

## 2020-06-15 DIAGNOSIS — E78.5 DYSLIPIDEMIA: ICD-10-CM

## 2020-06-15 DIAGNOSIS — Z87.898 HISTORY OF FEVER: ICD-10-CM

## 2020-06-15 DIAGNOSIS — Z13.6 SCREENING FOR ISCHEMIC HEART DISEASE: ICD-10-CM

## 2020-06-15 DIAGNOSIS — Z00.00 HEALTHCARE MAINTENANCE: ICD-10-CM

## 2020-06-15 DIAGNOSIS — Z13.1 SCREENING FOR DIABETES MELLITUS: ICD-10-CM

## 2020-06-15 DIAGNOSIS — M54.31 SCIATICA OF RIGHT SIDE: ICD-10-CM

## 2020-06-15 DIAGNOSIS — M70.61 TROCHANTERIC BURSITIS OF RIGHT HIP: ICD-10-CM

## 2020-06-15 DIAGNOSIS — N18.30 STAGE 3 CHRONIC KIDNEY DISEASE: ICD-10-CM

## 2020-06-15 DIAGNOSIS — Z12.5 SCREENING FOR PROSTATE CANCER: ICD-10-CM

## 2020-06-15 PROBLEM — R68.89 ABNORMAL ANKLE BRACHIAL INDEX (ABI): Status: RESOLVED | Noted: 2019-06-25 | Resolved: 2020-06-15

## 2020-06-15 PROCEDURE — G0439 PPPS, SUBSEQ VISIT: HCPCS | Performed by: FAMILY MEDICINE

## 2020-06-15 ASSESSMENT — PATIENT HEALTH QUESTIONNAIRE - PHQ9
SUM OF ALL RESPONSES TO PHQ QUESTIONS 1-9: 0
CLINICAL INTERPRETATION OF PHQ2 SCORE: 0

## 2020-06-15 ASSESSMENT — ACTIVITIES OF DAILY LIVING (ADL): BATHING_REQUIRES_ASSISTANCE: 0

## 2020-06-15 NOTE — ASSESSMENT & PLAN NOTE
Patient describes many years of intermittent right lateral hip pain without associated trauma.  He has not tried anything to help beyond some stretching at home.

## 2020-06-15 NOTE — ASSESSMENT & PLAN NOTE
Patient describes many years of intermittent right thigh pain.  He describes this as a sharp and shooting pain that comes and goes.  He denies associated back pain.  He denies bowel or bladder incontinence or retention.  He denies perineal anesthesia.  Patient has tried a number of exercises and stretches at home without success.

## 2020-06-15 NOTE — PROGRESS NOTES
Chief Complaint   Patient presents with   • Annual Wellness Visit   • Hip Pain     Rt. sided hip pain radiating to the front x years but getting worse.         HPI:  Xander is a 69 y.o. here for Medicare Annual Wellness Visit    Dyslipidemia  Patient has a slight dyslipidemia identified on past labs.    Healthcare maintenance  Lipids: Ordered.  Fasting Glucose: ordered.  Hepatitis C Screen: patient declines.  Colonoscopy: Done 5/22/2017 with 10 year recall.    Prevnar: recommended, patient declines.   Tdap: Done 2015.    History of fever  Patient is interested in COVID antibody testing.    Sciatica of right side  Patient describes many years of intermittent right thigh pain.  He describes this as a sharp and shooting pain that comes and goes.  He denies associated back pain.  He denies bowel or bladder incontinence or retention.  He denies perineal anesthesia.  Patient has tried a number of exercises and stretches at home without success.    Stage 3 chronic kidney disease (HCC)  This is a chronic and stable issue.    Trochanteric bursitis of right hip  Patient describes many years of intermittent right lateral hip pain without associated trauma.  He has not tried anything to help beyond some stretching at home.      Patient Active Problem List    Diagnosis Date Noted   • Sciatica of right side 06/15/2020   • Trochanteric bursitis of right hip 06/15/2020   • Right inguinal hernia 06/14/2019   • Paronychia of toe of left foot 06/14/2019   • Lethargy 12/18/2018   • Venous insufficiency 02/28/2018   • Dyslipidemia 02/28/2018   • Healthcare maintenance 02/28/2018   • Insomnia 12/03/2015   • Stage 3 chronic kidney disease (HCC) 12/03/2015       Current Outpatient Medications   Medication Sig Dispense Refill   • Melatonin CR 3 MG Tab CR 1 tablet PO nightly 90 Tab 3   • diphenhydramine (SLEEP AID, DIPHENHYDRAMINE,) 25 MG tablet Take 50 mg by mouth at bedtime as needed for Sleep.     • ibuprofen (MOTRIN) 600 MG Tab Take 600  mg by mouth every 6 hours as needed.       No current facility-administered medications for this visit.         Patient is taking medications as noted in medication list.  Current supplements as per medication list.     Allergies: Patient has no known allergies.    Current social contact/activities: Pt enjoys the outdoors with family and friends     Is patient current with immunizations? No, due for PREVNAR (PCV13) . Patient is interested in receiving NONE today.    He  reports that he has never smoked. He has never used smokeless tobacco. He reports current alcohol use. He reports that he does not use drugs.  Counseling given: Not Answered        DPA/Advanced directive: Patient has Advanced Directive, but it is not on file. Instructed to bring in a copy to scan into their chart.    ROS:    Gait: Uses no assistive device   Ostomy: No   Other tubes: No   Amputations: No   Chronic oxygen use No   Last eye exam 1 year ago.   Wears hearing aids: Yes   : Denies any urinary leakage during the last 6 months      Depression Screening    Little interest or pleasure in doing things?  0 - not at all  Feeling down, depressed, or hopeless? 0 - not at all  Patient Health Questionnaire Score: 0    If depressive symptoms identified deferred to follow up visit unless specifically addressed in assessment and plan.    Interpretation of PHQ-9 Total Score   Score Severity   1-4 No Depression   5-9 Mild Depression   10-14 Moderate Depression   15-19 Moderately Severe Depression   20-27 Severe Depression    Screening for Cognitive Impairment    Three Minute Recall (village, kitchen, baby)  3/3    Scooter clock face with all 12 numbers and set the hands to show 10 past 10.  Yes 5/5  If cognitive concerns identified, deferred for follow up unless specifically addressed in assessment and plan.    Fall Risk Assessment    Has the patient had two or more falls in the last year or any fall with injury in the last year?  No  If fall risk  identified, deferred for follow up unless specifically addressed in assessment and plan.    Safety Assessment    Throw rugs on floor.  Yes  Handrails on all stairs.  Yes  Good lighting in all hallways.  Yes  Difficulty hearing.  Yes  Patient counseled about all safety risks that were identified.    Functional Assessment ADLs    Are there any barriers preventing you from cooking for yourself or meeting nutritional needs?  No.    Are there any barriers preventing you from driving safely or obtaining transportation?  No.    Are there any barriers preventing you from using a telephone or calling for help?  No.    Are there any barriers preventing you from shopping?  No.    Are there any barriers preventing you from taking care of your own finances?  No.    Are there any barriers preventing you from managing your medications?  No.    Are there any barriers preventing you from showering, bathing or dressing yourself?  No.    Are you currently engaging in any exercise or physical activity?  Yes.  Pt. Goes surfing, jogs and hikes daily.  What is your perception of your health? good  .    Health Maintenance Summary                HEPATITIS C SCREENING Overdue 1950     IMM PNEUMOCOCCAL VACCINE: 65+ Years Overdue 11/6/2013     IMM ZOSTER VACCINES Postponed 11/12/2020 Originally 11/6/1998. System: vaccine not available, other system reasons    IMM INFLUENZA Next Due 9/1/2020      Done 1/23/2015 Imm Admin: Influenza TIV (IM)     Patient has more history with this topic...    Annual Wellness Visit Next Due 6/16/2021      Done 6/15/2020 SUBSEQUENT ANNUAL WELLNESS VISIT-INCLUDES PPPS ()     Patient has more history with this topic...    IMM DTaP/Tdap/Td Vaccine Next Due 1/23/2025      Done 1/23/2015 Imm Admin: Tdap Vaccine     Patient has more history with this topic...    COLONOSCOPY Next Due 5/16/2027      Done 5/16/2017 REFERRAL TO GI FOR COLONOSCOPY          Patient Care Team:  Dean Pascual M.D. as PCP - General  "(Family Medicine)    Social History     Tobacco Use   • Smoking status: Never Smoker   • Smokeless tobacco: Never Used   Substance Use Topics   • Alcohol use: Yes     Alcohol/week: 0.0 oz     Comment: Rarely   • Drug use: No     Family History   Problem Relation Age of Onset   • Cancer Mother 72        colon   • Heart Disease Father 82        CHF   • Heart Disease Maternal Grandmother    • Diabetes Maternal Grandfather    • Diabetes Paternal Grandfather    • Stroke Neg Hx      He  has a past medical history of Arthritis (12/2/16), Cataract, and Insomnia.   Past Surgical History:   Procedure Laterality Date   • SHOULDER DECOMPRESSION ARTHROSCOPIC Right 12/6/2016    Procedure: SHOULDER DECOMPRESSION ARTHROSCOPIC - SUBACROMIAL;  Surgeon: Clinton Trinh M.D.;  Location: SURGERY [facility];  Service:    • CLAVICLE DISTAL EXCISION Right 12/6/2016    Procedure: CLAVICLE DISTAL EXCISION - PARTIAL AND HANCOCK;  Surgeon: Clinton Trinh M.D.;  Location: SURGERY [facility];  Service:    • COLONOSCOPY  2007    neg   • FINGER EXPLORATION Right 1965    bone graft R middle   • TONSILLECTOMY  1958   • HERNIA REPAIR Bilateral age 7 mo    inguinal           Exam:     /64 (BP Location: Left arm, Patient Position: Sitting, BP Cuff Size: Adult long)   Pulse (!) 44   Temp 36.6 °C (97.8 °F) (Temporal)   Resp 18   Ht 1.778 m (5' 10\")   Wt 85.1 kg (187 lb 9.6 oz)   SpO2 96%  Body mass index is 26.92 kg/m².    Hearing good.    Dentition good  Alert, oriented in no acute distress.  Eye contact is good, speech goal directed, affect calm  Cardio: Regular rate and rhythm, no murmurs, rubs, or gallops.  Resp: CTAB no w/r/r.   MSK: No tenderness to palpation of the spine or hips bilaterally.  No trochanteric bursal tenderness.  Positive Can testing on the right side.  Negative straight leg raise bilaterally.  Sensation intact in all dermatome distributions of bilateral lower extremities.  Gait normal.  Strength 5-5 in the " bilateral lower extremities.    Assessment and Plan. The following treatment and monitoring plan is recommended:      1. Dyslipidemia  - Lipid Profile; Future    2. Healthcare maintenance  - see HPI.   - Subsequent Annual Wellness Visit - Includes PPPS ()    3. Stage 3 chronic kidney disease (HCC)  - Comp Metabolic Panel; Future    4. Screening for diabetes mellitus  - Comp Metabolic Panel; Future    5. Screening for ischemic heart disease  - Lipid Profile; Future    6. Screening for prostate cancer  - PROSTATE SPECIFIC AG SCREENING; Future    7. History of fever  I informed the patient that the COVID antibody test is not FDA approved and we are uncertain as to its accuracy. Additionally, we do not know how much it will cost and if it will be covered by insurance.   - SARS CoV-2 Ab, Total; Future    8. Sciatica of right side  I believe that the patient's right thigh pain is most consistent with sciatica.  It is been many years and he had a plain film several years ago.  It is nonresponsive to conservative therapies having an MRI would be warranted.  - MR-LUMBAR SPINE-W/O; Future    9. Trochanteric bursitis of right hip  Patient's right lateral hip pain is most consistent with a trochanteric bursitis.  I recommended a salon pas patch.  If this fails, I informed him that he may return for an injection.        Services suggested: No services needed at this time  Health Care Screening recommendations as per orders if indicated.  Referrals offered: PT/OT/Nutrition counseling/Behavioral Health/Smoking cessation as per orders if indicated.    Discussion today about general wellness and lifestyle habits:    · Prevent falls and reduce trip hazards; Cautioned about securing or removing rugs.  · Have a working fire alarm and carbon monoxide detector;   · Engage in regular physical activity and social activities       Follow-up: Return in about 1 year (around 6/15/2021), or if symptoms worsen or fail to improve, for  Wellness Visit, Long.

## 2020-06-15 NOTE — ASSESSMENT & PLAN NOTE
Lipids: Ordered.  Fasting Glucose: ordered.  Hepatitis C Screen: patient declines.  Colonoscopy: Done 5/22/2017 with 10 year recall.    Prevnar: recommended, patient declines.   Tdap: Done 2015.

## 2020-06-26 ENCOUNTER — HOSPITAL ENCOUNTER (OUTPATIENT)
Dept: LAB | Facility: MEDICAL CENTER | Age: 70
End: 2020-06-26
Attending: FAMILY MEDICINE
Payer: MEDICARE

## 2020-06-26 ENCOUNTER — APPOINTMENT (OUTPATIENT)
Dept: RADIOLOGY | Facility: MEDICAL CENTER | Age: 70
End: 2020-06-26
Attending: FAMILY MEDICINE
Payer: MEDICARE

## 2020-06-26 DIAGNOSIS — E78.5 DYSLIPIDEMIA: ICD-10-CM

## 2020-06-26 DIAGNOSIS — M54.31 SCIATICA OF RIGHT SIDE: ICD-10-CM

## 2020-06-26 DIAGNOSIS — N18.30 STAGE 3 CHRONIC KIDNEY DISEASE: ICD-10-CM

## 2020-06-26 DIAGNOSIS — Z87.898 HISTORY OF FEVER: ICD-10-CM

## 2020-06-26 DIAGNOSIS — Z12.5 SCREENING FOR PROSTATE CANCER: ICD-10-CM

## 2020-06-26 DIAGNOSIS — Z13.1 SCREENING FOR DIABETES MELLITUS: ICD-10-CM

## 2020-06-26 DIAGNOSIS — Z13.6 SCREENING FOR ISCHEMIC HEART DISEASE: ICD-10-CM

## 2020-06-26 LAB
ALBUMIN SERPL BCP-MCNC: 4.2 G/DL (ref 3.2–4.9)
ALBUMIN/GLOB SERPL: 1.6 G/DL
ALP SERPL-CCNC: 43 U/L (ref 30–99)
ALT SERPL-CCNC: 28 U/L (ref 2–50)
ANION GAP SERPL CALC-SCNC: 10 MMOL/L (ref 7–16)
AST SERPL-CCNC: 29 U/L (ref 12–45)
BILIRUB SERPL-MCNC: 0.9 MG/DL (ref 0.1–1.5)
BUN SERPL-MCNC: 19 MG/DL (ref 8–22)
CALCIUM SERPL-MCNC: 9.8 MG/DL (ref 8.5–10.5)
CHLORIDE SERPL-SCNC: 103 MMOL/L (ref 96–112)
CHOLEST SERPL-MCNC: 149 MG/DL (ref 100–199)
CO2 SERPL-SCNC: 27 MMOL/L (ref 20–33)
CREAT SERPL-MCNC: 1.29 MG/DL (ref 0.5–1.4)
FASTING STATUS PATIENT QL REPORTED: NORMAL
GLOBULIN SER CALC-MCNC: 2.7 G/DL (ref 1.9–3.5)
GLUCOSE SERPL-MCNC: 86 MG/DL (ref 65–99)
HDLC SERPL-MCNC: 49 MG/DL
LDLC SERPL CALC-MCNC: 85 MG/DL
POTASSIUM SERPL-SCNC: 4.5 MMOL/L (ref 3.6–5.5)
PROT SERPL-MCNC: 6.9 G/DL (ref 6–8.2)
PSA SERPL-MCNC: 0.81 NG/ML (ref 0–4)
SARS-COV-2 AB SERPL QL IA: NORMAL
SODIUM SERPL-SCNC: 140 MMOL/L (ref 135–145)
TRIGL SERPL-MCNC: 76 MG/DL (ref 0–149)

## 2020-06-26 PROCEDURE — 36415 COLL VENOUS BLD VENIPUNCTURE: CPT

## 2020-06-26 PROCEDURE — 84153 ASSAY OF PSA TOTAL: CPT

## 2020-06-26 PROCEDURE — 72148 MRI LUMBAR SPINE W/O DYE: CPT

## 2020-06-26 PROCEDURE — 80053 COMPREHEN METABOLIC PANEL: CPT

## 2020-06-26 PROCEDURE — 80061 LIPID PANEL: CPT

## 2020-06-26 PROCEDURE — 86769 SARS-COV-2 COVID-19 ANTIBODY: CPT

## 2020-07-01 ENCOUNTER — PATIENT MESSAGE (OUTPATIENT)
Dept: MEDICAL GROUP | Facility: MEDICAL CENTER | Age: 70
End: 2020-07-01

## 2020-07-01 DIAGNOSIS — M54.31 SCIATICA OF RIGHT SIDE: ICD-10-CM

## 2021-01-07 ENCOUNTER — PATIENT OUTREACH (OUTPATIENT)
Dept: MEDICAL GROUP | Facility: MEDICAL CENTER | Age: 71
End: 2021-01-07

## 2021-01-07 NOTE — PROGRESS NOTES
1/7/2021  Referral received from [facility], specifying need of Advance Life Planning (AD Inquiry). TC to pt to engage, assess and establish care plan. Pt educated on role of SW and reason for referral. Pt on walk request return TC at later time/date.       1/8/2021  TC to pt. Discussed AD-HC. Pt requesting additional packet be sent to alt. address for spouse to complete. Declined further social need at this time. Confirms pt has SW's contact information if pt has further questions regarding AD-HC and/or other social needs.    AD-HC mailed to:  [id number]  [facility] [id number]    Plan:  SW will not actively follow at this time  Social need met at time of contact

## 2021-01-15 DIAGNOSIS — Z23 NEED FOR VACCINATION: ICD-10-CM

## 2021-06-15 ENCOUNTER — PATIENT MESSAGE (OUTPATIENT)
Dept: MEDICAL GROUP | Facility: MEDICAL CENTER | Age: 71
End: 2021-06-15

## 2021-06-15 ENCOUNTER — NURSE TRIAGE (OUTPATIENT)
Dept: MEDICAL GROUP | Facility: MEDICAL CENTER | Age: 71
End: 2021-06-15

## 2021-06-15 NOTE — TELEPHONE ENCOUNTER
Pt states that these symptoms have gone on for some time but feels comfortable waiting until 6/29 for his appointment. Pt would like to be seen sooner d/t hernia and being limited with his physical activity. Advised pt to call if symptoms get worse and I will review with Dr. Pascual and provide pt with updates. Pt verbalized understanding and denies any further needs at this time.     Reason for Disposition  • Fatigue is a chronic symptom (recurrent or ongoing AND present > 4 weeks)    Additional Information  • Negative: SEVERE difficulty breathing (e.g., struggling for each breath, speaks in single words)  • Negative: Shock suspected (e.g., cold/pale/clammy skin, too weak to stand, low BP, rapid pulse)  • Negative: Difficult to awaken or acting confused (e.g., disoriented, slurred speech)  • Negative: Fainted > 15 minutes ago and still feels too weak or dizzy to stand  • Negative: SEVERE weakness (i.e., unable to walk or barely able to walk, requires support) and new onset or worsening  • Negative: Sounds like a life-threatening emergency to the triager  • Negative: Weakness of the face, arm or leg on one side of the body  • Negative: Has diabetes and weakness from low blood sugar (i.e., < 60 mg/dL or 3.5 mmol/L)  • Negative: Recent heat exposure, suspected cause of weakness  • Negative: Vomiting is the main symptom  • Negative: Diarrhea is the main symptom  • Negative: Difficulty breathing  • Negative: Heart beating < 50 beats per minute OR > 140 beats per minute  • Negative: Extra heartbeats OR irregular heart beating (i.e., 'palpitations')  • Negative: Follows bleeding (e.g., from vomiting, rectum, vagina)  • Negative: Bloody, black, or tarry bowel movements  • Negative: MODERATE weakness from poor fluid intake with no improvement after 2 hours of rest and fluids  • Negative: Drinking very little and dehydration suspected (e.g., no urine > 12 hours, very dry mouth, very lightheaded)  • Negative: Patient sounds  very sick or weak to the triager  • Negative: MODERATE weakness (i.e., interferes with work, school, normal activities) and cause unknown  • Negative: Fever > 103 F (39.4 C) and not able to get the fever down using CARE ADVICE  • Negative: Fever > 100.0 F (37.8 C) and bedridden (e.g., nursing home patient, stroke, chronic illness, recovering from surgery)  • Negative: Fever > 101 F (38.3 C) and over 60 years of age  • Negative: Fever > 100.0 F (37.8 C) and diabetes mellitus or weak immune system (e.g., HIV positive, cancer chemo, splenectomy, organ transplant, chronic steroids)  • Negative: Pale skin (pallor)  • Negative: MODERATE weakness (i.e., interferes with work, school, normal activities) and persists > 3 days  • Negative: Taking a medicine that could cause weakness (e.g., blood pressure medications, diuretics)  • Negative: Patient wants to be seen  • Negative: MILD weakness (i.e., does not interfere with ability to work, go to school, normal activities) and persists > 1 week  • Negative: Fatigue (i.e., tires easily, decreased energy) and persists > 1 week  • Negative: Weakness is a chronic symptom (recurrent or ongoing AND lasting > 4 weeks)  • Negative: MILD weakness or fatigue with acute minor illness (e.g., colds)  • Negative: Weakness from poor fluid intake    Protocols used: WEAKNESS (GENERALIZED) AND FATIGUE-A-OH

## 2021-06-21 DIAGNOSIS — Z13.6 SCREENING FOR ISCHEMIC HEART DISEASE: ICD-10-CM

## 2021-06-21 DIAGNOSIS — Z12.5 SCREENING FOR PROSTATE CANCER: ICD-10-CM

## 2021-06-21 DIAGNOSIS — N18.31 STAGE 3A CHRONIC KIDNEY DISEASE: ICD-10-CM

## 2021-06-21 DIAGNOSIS — R53.83 LETHARGY: ICD-10-CM

## 2021-06-22 ENCOUNTER — HOSPITAL ENCOUNTER (OUTPATIENT)
Dept: LAB | Facility: MEDICAL CENTER | Age: 71
End: 2021-06-22
Attending: FAMILY MEDICINE
Payer: MEDICARE

## 2021-06-22 DIAGNOSIS — Z13.6 SCREENING FOR ISCHEMIC HEART DISEASE: ICD-10-CM

## 2021-06-22 DIAGNOSIS — N18.31 STAGE 3A CHRONIC KIDNEY DISEASE: ICD-10-CM

## 2021-06-22 DIAGNOSIS — Z12.5 SCREENING FOR PROSTATE CANCER: ICD-10-CM

## 2021-06-22 DIAGNOSIS — R53.83 LETHARGY: ICD-10-CM

## 2021-06-22 LAB
ALBUMIN SERPL BCP-MCNC: 4.4 G/DL (ref 3.2–4.9)
ALBUMIN/GLOB SERPL: 1.6 G/DL
ALP SERPL-CCNC: 47 U/L (ref 30–99)
ALT SERPL-CCNC: 21 U/L (ref 2–50)
ANION GAP SERPL CALC-SCNC: 10 MMOL/L (ref 7–16)
AST SERPL-CCNC: 27 U/L (ref 12–45)
BASOPHILS # BLD AUTO: 0.4 % (ref 0–1.8)
BASOPHILS # BLD: 0.03 K/UL (ref 0–0.12)
BILIRUB SERPL-MCNC: 1.1 MG/DL (ref 0.1–1.5)
BUN SERPL-MCNC: 26 MG/DL (ref 8–22)
CALCIUM SERPL-MCNC: 9.8 MG/DL (ref 8.5–10.5)
CHLORIDE SERPL-SCNC: 107 MMOL/L (ref 96–112)
CHOLEST SERPL-MCNC: 160 MG/DL (ref 100–199)
CO2 SERPL-SCNC: 26 MMOL/L (ref 20–33)
CREAT SERPL-MCNC: 1.27 MG/DL (ref 0.5–1.4)
EOSINOPHIL # BLD AUTO: 0.11 K/UL (ref 0–0.51)
EOSINOPHIL NFR BLD: 1.5 % (ref 0–6.9)
ERYTHROCYTE [DISTWIDTH] IN BLOOD BY AUTOMATED COUNT: 45.3 FL (ref 35.9–50)
GLOBULIN SER CALC-MCNC: 2.8 G/DL (ref 1.9–3.5)
GLUCOSE SERPL-MCNC: 82 MG/DL (ref 65–99)
HCT VFR BLD AUTO: 47.4 % (ref 42–52)
HDLC SERPL-MCNC: 44 MG/DL
HGB BLD-MCNC: 15.7 G/DL (ref 14–18)
IMM GRANULOCYTES # BLD AUTO: 0.03 K/UL (ref 0–0.11)
IMM GRANULOCYTES NFR BLD AUTO: 0.4 % (ref 0–0.9)
LDLC SERPL CALC-MCNC: 99 MG/DL
LYMPHOCYTES # BLD AUTO: 1.68 K/UL (ref 1–4.8)
LYMPHOCYTES NFR BLD: 22.6 % (ref 22–41)
MCH RBC QN AUTO: 31.3 PG (ref 27–33)
MCHC RBC AUTO-ENTMCNC: 33.1 G/DL (ref 33.7–35.3)
MCV RBC AUTO: 94.6 FL (ref 81.4–97.8)
MONOCYTES # BLD AUTO: 0.6 K/UL (ref 0–0.85)
MONOCYTES NFR BLD AUTO: 8.1 % (ref 0–13.4)
NEUTROPHILS # BLD AUTO: 4.99 K/UL (ref 1.82–7.42)
NEUTROPHILS NFR BLD: 67 % (ref 44–72)
NRBC # BLD AUTO: 0 K/UL
NRBC BLD-RTO: 0 /100 WBC
PLATELET # BLD AUTO: 228 K/UL (ref 164–446)
PMV BLD AUTO: 10.8 FL (ref 9–12.9)
POTASSIUM SERPL-SCNC: 4.6 MMOL/L (ref 3.6–5.5)
PROT SERPL-MCNC: 7.2 G/DL (ref 6–8.2)
PSA SERPL-MCNC: 0.65 NG/ML (ref 0–4)
RBC # BLD AUTO: 5.01 M/UL (ref 4.7–6.1)
SODIUM SERPL-SCNC: 143 MMOL/L (ref 135–145)
TESTOST SERPL-MCNC: 670 NG/DL (ref 175–781)
TRIGL SERPL-MCNC: 87 MG/DL (ref 0–149)
TSH SERPL DL<=0.005 MIU/L-ACNC: 2.35 UIU/ML (ref 0.38–5.33)
WBC # BLD AUTO: 7.4 K/UL (ref 4.8–10.8)

## 2021-06-22 PROCEDURE — 80061 LIPID PANEL: CPT

## 2021-06-22 PROCEDURE — 85025 COMPLETE CBC W/AUTO DIFF WBC: CPT

## 2021-06-22 PROCEDURE — 80053 COMPREHEN METABOLIC PANEL: CPT

## 2021-06-22 PROCEDURE — 84153 ASSAY OF PSA TOTAL: CPT

## 2021-06-22 PROCEDURE — 84443 ASSAY THYROID STIM HORMONE: CPT

## 2021-06-22 PROCEDURE — 36415 COLL VENOUS BLD VENIPUNCTURE: CPT

## 2021-06-22 PROCEDURE — 84403 ASSAY OF TOTAL TESTOSTERONE: CPT

## 2021-06-29 ENCOUNTER — OFFICE VISIT (OUTPATIENT)
Dept: MEDICAL GROUP | Facility: MEDICAL CENTER | Age: 71
End: 2021-06-29
Payer: MEDICARE

## 2021-06-29 VITALS
OXYGEN SATURATION: 93 % | WEIGHT: 180.8 LBS | SYSTOLIC BLOOD PRESSURE: 102 MMHG | RESPIRATION RATE: 20 BRPM | BODY MASS INDEX: 24.49 KG/M2 | HEART RATE: 50 BPM | DIASTOLIC BLOOD PRESSURE: 60 MMHG | TEMPERATURE: 98 F | HEIGHT: 72 IN

## 2021-06-29 DIAGNOSIS — K40.90 RIGHT INGUINAL HERNIA: ICD-10-CM

## 2021-06-29 DIAGNOSIS — N18.31 STAGE 3A CHRONIC KIDNEY DISEASE: ICD-10-CM

## 2021-06-29 DIAGNOSIS — Z00.00 HEALTHCARE MAINTENANCE: ICD-10-CM

## 2021-06-29 DIAGNOSIS — E78.5 DYSLIPIDEMIA: ICD-10-CM

## 2021-06-29 PROCEDURE — G0439 PPPS, SUBSEQ VISIT: HCPCS | Performed by: FAMILY MEDICINE

## 2021-06-29 ASSESSMENT — ENCOUNTER SYMPTOMS: GENERAL WELL-BEING: EXCELLENT

## 2021-06-29 ASSESSMENT — PATIENT HEALTH QUESTIONNAIRE - PHQ9
CLINICAL INTERPRETATION OF PHQ2 SCORE: 2
5. POOR APPETITE OR OVEREATING: 1 - SEVERAL DAYS
SUM OF ALL RESPONSES TO PHQ QUESTIONS 1-9: 8

## 2021-06-29 ASSESSMENT — FIBROSIS 4 INDEX: FIB4 SCORE: 1.86

## 2021-06-29 ASSESSMENT — ACTIVITIES OF DAILY LIVING (ADL): BATHING_REQUIRES_ASSISTANCE: 0

## 2021-06-29 NOTE — ASSESSMENT & PLAN NOTE
Lipids: done 2021.   Fasting Glucose: done 2021.   Hepatitis C Screen: patient declines.  Colonoscopy: Done 5/22/2017 with 10 year recall.    Prevnar: recommended, patient declines.   Tdap: Done 2015.   Shingrix: Recommended, patient declines.  Covid vaccine: Recommended, patient declines.

## 2021-06-29 NOTE — PROGRESS NOTES
Chief Complaint   Patient presents with   • Annual Exam     Discuss kidney function and metabolism   • Inguinal Hernia     Rt. sided; would likt it re checked.       HPI:  Xander is a 70 y.o. here for Medicare Annual Wellness Visit    Dyslipidemia  The 10-year ASCVD risk score (Nakita LOPEZ Jr., et al., 2013) is: 13.7%    Stage 3 chronic kidney disease (HCC)  A chronic and stable problem.    Right inguinal hernia  Persists, seems to be getting bigger.     Healthcare maintenance  Lipids: done 2021.   Fasting Glucose: done 2021.   Hepatitis C Screen: patient declines.  Colonoscopy: Done 5/22/2017 with 10 year recall.    Prevnar: recommended, patient declines.   Tdap: Done 2015.   Shingrix: Recommended, patient declines.  Covid vaccine: Recommended, patient declines.      Patient Active Problem List    Diagnosis Date Noted   • Sciatica of right side 06/15/2020   • Trochanteric bursitis of right hip 06/15/2020   • Right inguinal hernia 06/14/2019   • Paronychia of toe of left foot 06/14/2019   • Lethargy 12/18/2018   • Venous insufficiency 02/28/2018   • Dyslipidemia 02/28/2018   • Healthcare maintenance 02/28/2018   • Insomnia 12/03/2015   • Stage 3 chronic kidney disease (HCC) 12/03/2015       Current Outpatient Medications   Medication Sig Dispense Refill   • Melatonin CR 3 MG Tab CR 1 tablet PO nightly 90 Tab 3   • diphenhydramine (SLEEP AID, DIPHENHYDRAMINE,) 25 MG tablet Take 50 mg by mouth at bedtime as needed for Sleep.     • ibuprofen (MOTRIN) 600 MG Tab Take 600 mg by mouth every 6 hours as needed.       No current facility-administered medications for this visit.        Patient is taking medications as noted in medication list.  Current supplements as per medication list.     Allergies: Patient has no known allergies.    Current social contact/activities: pt. Stays in touch with family and friends. Pt. Is active in his [employer]     Is patient current with immunizations? No, due for PNEUMOVAX (PPSV23) and SHINGRIX  (Shingles). Patient is interested in receiving NONE today.    He  reports that he has never smoked. He has never used smokeless tobacco. He reports current alcohol use. He reports that he does not use drugs.  Counseling given: Not Answered      DPA/Advanced directive: Patient has Advanced Directive, but it is not on file. Instructed to bring in a copy to scan into their chart.    ROS:    Gait: Uses no assistive device   Ostomy: No   Other tubes: No   Amputations: No   Chronic oxygen use No   Last eye exam 1 year ago   Wears hearing aids: No   : Denies any urinary leakage during the last 6 months        Depression Screening  Little interest or pleasure in doing things?  1 - several days  Feeling down, depressed, or hopeless? 1 - several days  Trouble falling or staying asleep, or sleeping too much?  1 - several days  Feeling tired or having little energy?  3 - nearly every day  Poor appetite or overeating?  1 - several days  Feeling bad about yourself - or that you are a failure or have let yourself or your family down? 0 - not at all  Trouble concentrating on things, such as reading the newspaper or watching television? 1 - several days  Moving or speaking so slowly that other people could have noticed.  Or the opposite - being so fidgety or restless that you have been moving around a lot more than usual?  0 - not at all  Thoughts that you would be better off dead, or of hurting yourself?  0 - not at all  Patient Health Questionnaire Score: 8    If depressive symptoms identified deferred to follow up visit unless specifically addressed in assessment and plan.    Interpretation of PHQ-9 Total Score   Score Severity   1-4 No Depression   5-9 Mild Depression   10-14 Moderate Depression   15-19 Moderately Severe Depression   20-27 Severe Depression      Screening for Cognitive Impairment  Three Minute Recall (captain, mel, picture)  3/3    Draw clock face with all 12 numbers and set the hands to show 5 past 8.  Yes  5/5  If cognitive concerns identified, deferred for follow up unless specifically addressed in assessment and plan.    Fall Risk Assessment  Has the patient had two or more falls in the last year or any fall with injury in the last year?  No  If fall risk identified, deferred for follow up unless specifically addressed in assessment and plan.    Safety Assessment  Throw rugs on floor.  Yes  Handrails on all stairs.  Yes  Good lighting in all hallways.  Yes  Difficulty hearing.  Yes  Patient counseled about all safety risks that were identified.    Functional Assessment ADLs  Are there any barriers preventing you from cooking for yourself or meeting nutritional needs?  No.    Are there any barriers preventing you from driving safely or obtaining transportation?  No.    Are there any barriers preventing you from using a telephone or calling for help?  No.    Are there any barriers preventing you from shopping?  No.    Are there any barriers preventing you from taking care of your own finances?  No.    Are there any barriers preventing you from managing your medications?    No.    Are there any barriers preventing you from showering, bathing or dressing yourself?  No.    Are you currently engaging in any exercise or physical activity?  Yes.  Pt. Enjoys surfing and hiking.   Pt. Takes care of his home.   What is your perception of your health?  Excellent.    Health Maintenance Summary                HEPATITIS C SCREENING Overdue 1950     COVID-19 Vaccine Overdue 11/6/1960     IMM ZOSTER VACCINES Overdue 11/6/1998     IMM PNEUMOCOCCAL VACCINE: 65+ Years Overdue 11/6/2013     IMM INFLUENZA Next Due 9/1/2021      Done 1/23/2015 Imm Admin: INFLUENZA TIV (IM)     Patient has more history with this topic...    Annual Wellness Visit Next Due 6/30/2022      Done 6/29/2021 SUBSEQUENT ANNUAL WELLNESS VISIT-INCLUDES PPPS ()     Patient has more history with this topic...    IMM DTaP/Tdap/Td Vaccine Next Due 1/23/2025      " Done 1/23/2015 Imm Admin: Tdap Vaccine     Patient has more history with this topic...    COLONOSCOPY Next Due 5/16/2027      Done 5/16/2017 REFERRAL TO GI FOR COLONOSCOPY          Patient Care Team:  Dean Pascual M.D. as PCP - General (Family Medicine)    Social History     Tobacco Use   • Smoking status: Never Smoker   • Smokeless tobacco: Never Used   Vaping Use   • Vaping Use: Never used   Substance Use Topics   • Alcohol use: Yes     Alcohol/week: 0.0 oz     Comment: Rarely   • Drug use: No     Family History   Problem Relation Age of Onset   • Cancer Mother 72        colon   • Heart Disease Father 82        CHF   • Heart Disease Maternal Grandmother    • Diabetes Maternal Grandfather    • Diabetes Paternal Grandfather    • Stroke Neg Hx      He  has a past medical history of Arthritis (12/2/16), Cataract, and Insomnia.   Past Surgical History:   Procedure Laterality Date   • SHOULDER DECOMPRESSION ARTHROSCOPIC Right 12/6/2016    Procedure: SHOULDER DECOMPRESSION ARTHROSCOPIC - SUBACROMIAL;  Surgeon: Clinton Trinh M.D.;  Location: SURGERY [facility];  Service:    • CLAVICLE DISTAL EXCISION Right 12/6/2016    Procedure: CLAVICLE DISTAL EXCISION - PARTIAL AND HANCOCK;  Surgeon: Clinton Trinh M.D.;  Location: SURGERY [facility];  Service:    • COLONOSCOPY  2007    neg   • FINGER EXPLORATION Right 1965    bone graft R middle   • TONSILLECTOMY  1958   • HERNIA REPAIR Bilateral age 7 mo    inguinal         Exam:   /60 (BP Location: Left arm, Patient Position: Sitting, BP Cuff Size: Adult long)   Pulse (!) 50   Temp 36.7 °C (98 °F) (Temporal)   Resp 20   Ht 1.816 m (5' 11.5\")   Wt 82 kg (180 lb 12.8 oz)   SpO2 93%  Body mass index is 24.87 kg/m².    Hearing good.    Dentition good  Alert, oriented in no acute distress.  Eye contact is good, speech goal directed, affect calm  Cardio: RRR.   Resp: CTAB.   : R reducible inguinal hernia.       Assessment and Plan. The following treatment and " monitoring plan is recommended:      1. Healthcare maintenance  - Subsequent Annual Wellness Visit - Includes PPPS ()    2. Dyslipidemia  I reviewed with the patient his ASCVD risk.  I recommended a statin medication or, if he is reluctant, coronary calcium score.  He declines and would like to work on diet and exercise.    3. Right inguinal hernia  - REFERRAL TO GENERAL SURGERY    4. Stage 3a chronic kidney disease (HCC)  - monitor.       Services suggested: No services needed at this time  Health Care Screening recommendations as per orders if indicated.  Referrals offered: PT/OT/Nutrition counseling/Behavioral Health/Smoking cessation as per orders if indicated.    Discussion today about general wellness and lifestyle habits:    · Prevent falls and reduce trip hazards; Cautioned about securing or removing rugs.  · Have a working fire alarm and carbon monoxide detector;   · Engage in regular physical activity and social activities     Follow-up: Return in about 1 year (around 6/29/2022), or if symptoms worsen or fail to improve, for Wellness Visit, Long.

## 2021-07-16 ENCOUNTER — PATIENT MESSAGE (OUTPATIENT)
Dept: HEALTH INFORMATION MANAGEMENT | Facility: OTHER | Age: 71
End: 2021-07-16

## 2021-08-27 ENCOUNTER — PRE-ADMISSION TESTING (OUTPATIENT)
Dept: ADMISSIONS | Facility: MEDICAL CENTER | Age: 71
End: 2021-08-27
Attending: SURGERY
Payer: MEDICARE

## 2021-08-27 DIAGNOSIS — Z01.812 PRE-OPERATIVE LABORATORY EXAMINATION: ICD-10-CM

## 2021-08-27 DIAGNOSIS — Z01.810 PRE-OPERATIVE CARDIOVASCULAR EXAMINATION: ICD-10-CM

## 2021-08-27 LAB
ANION GAP SERPL CALC-SCNC: 9 MMOL/L (ref 7–16)
BUN SERPL-MCNC: 24 MG/DL (ref 8–22)
CALCIUM SERPL-MCNC: 9.7 MG/DL (ref 8.4–10.2)
CHLORIDE SERPL-SCNC: 105 MMOL/L (ref 96–112)
CO2 SERPL-SCNC: 29 MMOL/L (ref 20–33)
CREAT SERPL-MCNC: 1.28 MG/DL (ref 0.5–1.4)
EKG IMPRESSION: NORMAL
ERYTHROCYTE [DISTWIDTH] IN BLOOD BY AUTOMATED COUNT: 44 FL (ref 35.9–50)
GLUCOSE SERPL-MCNC: 106 MG/DL (ref 65–99)
HCT VFR BLD AUTO: 42.8 % (ref 42–52)
HGB BLD-MCNC: 14.5 G/DL (ref 14–18)
MCH RBC QN AUTO: 31.8 PG (ref 27–33)
MCHC RBC AUTO-ENTMCNC: 33.9 G/DL (ref 33.7–35.3)
MCV RBC AUTO: 93.9 FL (ref 81.4–97.8)
PLATELET # BLD AUTO: 259 K/UL (ref 164–446)
PMV BLD AUTO: 9.6 FL (ref 9–12.9)
POTASSIUM SERPL-SCNC: 4 MMOL/L (ref 3.6–5.5)
RBC # BLD AUTO: 4.56 M/UL (ref 4.7–6.1)
SODIUM SERPL-SCNC: 143 MMOL/L (ref 135–145)
WBC # BLD AUTO: 7.6 K/UL (ref 4.8–10.8)

## 2021-08-27 PROCEDURE — U0003 INFECTIOUS AGENT DETECTION BY NUCLEIC ACID (DNA OR RNA); SEVERE ACUTE RESPIRATORY SYNDROME CORONAVIRUS 2 (SARS-COV-2) (CORONAVIRUS DISEASE [COVID-19]), AMPLIFIED PROBE TECHNIQUE, MAKING USE OF HIGH THROUGHPUT TECHNOLOGIES AS DESCRIBED BY CMS-2020-01-R: HCPCS

## 2021-08-27 PROCEDURE — 93010 ELECTROCARDIOGRAM REPORT: CPT | Performed by: INTERNAL MEDICINE

## 2021-08-27 PROCEDURE — 93005 ELECTROCARDIOGRAM TRACING: CPT

## 2021-08-27 PROCEDURE — C9803 HOPD COVID-19 SPEC COLLECT: HCPCS

## 2021-08-27 PROCEDURE — 36415 COLL VENOUS BLD VENIPUNCTURE: CPT

## 2021-08-27 PROCEDURE — U0005 INFEC AGEN DETEC AMPLI PROBE: HCPCS

## 2021-08-27 PROCEDURE — 80048 BASIC METABOLIC PNL TOTAL CA: CPT

## 2021-08-27 PROCEDURE — 85027 COMPLETE CBC AUTOMATED: CPT

## 2021-08-27 ASSESSMENT — FIBROSIS 4 INDEX: FIB4 SCORE: 1.86

## 2021-08-27 NOTE — PREPROCEDURE INSTRUCTIONS
Hx and meds reviewed, pre op instructions given, handouts reviewed, questions answered.  Pt instructed to continue regularly prescribed medications through day before surgery. Anesthesia fasting guidelines reviewed with pt. Covid testing completed, pt aware to continue wearing mask per mandate. Pt states he thought he was having an umbilical hernia reapair as well. Office called and spoke to Abigail, she states pt did not want umbilcal done at this time. They will clarify with pt and send a new consent if necessary.

## 2021-08-28 LAB
SARS-COV-2 RNA RESP QL NAA+PROBE: NOTDETECTED
SPECIMEN SOURCE: NORMAL

## 2021-09-03 PROCEDURE — 87426 SARSCOV CORONAVIRUS AG IA: CPT

## 2021-09-03 PROCEDURE — C9803 HOPD COVID-19 SPEC COLLECT: HCPCS

## 2021-09-21 ENCOUNTER — PRE-ADMISSION TESTING (OUTPATIENT)
Dept: ADMISSIONS | Facility: MEDICAL CENTER | Age: 71
End: 2021-09-21
Attending: SURGERY
Payer: MEDICARE

## 2021-09-21 DIAGNOSIS — Z01.812 PRE-OPERATIVE LABORATORY EXAMINATION: ICD-10-CM

## 2021-09-21 LAB
ANION GAP SERPL CALC-SCNC: 11 MMOL/L (ref 7–16)
BUN SERPL-MCNC: 21 MG/DL (ref 8–22)
CALCIUM SERPL-MCNC: 9.7 MG/DL (ref 8.4–10.2)
CHLORIDE SERPL-SCNC: 105 MMOL/L (ref 96–112)
CO2 SERPL-SCNC: 26 MMOL/L (ref 20–33)
CREAT SERPL-MCNC: 1.13 MG/DL (ref 0.5–1.4)
ERYTHROCYTE [DISTWIDTH] IN BLOOD BY AUTOMATED COUNT: 44 FL (ref 35.9–50)
GLUCOSE SERPL-MCNC: 94 MG/DL (ref 65–99)
HCT VFR BLD AUTO: 45.5 % (ref 42–52)
HGB BLD-MCNC: 15.3 G/DL (ref 14–18)
MCH RBC QN AUTO: 31.7 PG (ref 27–33)
MCHC RBC AUTO-ENTMCNC: 33.6 G/DL (ref 33.7–35.3)
MCV RBC AUTO: 94.2 FL (ref 81.4–97.8)
PLATELET # BLD AUTO: 244 K/UL (ref 164–446)
PMV BLD AUTO: 8.8 FL (ref 9–12.9)
POTASSIUM SERPL-SCNC: 4.9 MMOL/L (ref 3.6–5.5)
RBC # BLD AUTO: 4.83 M/UL (ref 4.7–6.1)
SODIUM SERPL-SCNC: 142 MMOL/L (ref 135–145)
WBC # BLD AUTO: 7.5 K/UL (ref 4.8–10.8)

## 2021-09-21 PROCEDURE — 36415 COLL VENOUS BLD VENIPUNCTURE: CPT

## 2021-09-21 PROCEDURE — C9803 HOPD COVID-19 SPEC COLLECT: HCPCS

## 2021-09-21 PROCEDURE — U0003 INFECTIOUS AGENT DETECTION BY NUCLEIC ACID (DNA OR RNA); SEVERE ACUTE RESPIRATORY SYNDROME CORONAVIRUS 2 (SARS-COV-2) (CORONAVIRUS DISEASE [COVID-19]), AMPLIFIED PROBE TECHNIQUE, MAKING USE OF HIGH THROUGHPUT TECHNOLOGIES AS DESCRIBED BY CMS-2020-01-R: HCPCS

## 2021-09-21 PROCEDURE — 85027 COMPLETE CBC AUTOMATED: CPT

## 2021-09-21 PROCEDURE — U0005 INFEC AGEN DETEC AMPLI PROBE: HCPCS

## 2021-09-21 PROCEDURE — 80048 BASIC METABOLIC PNL TOTAL CA: CPT

## 2021-09-21 ASSESSMENT — FIBROSIS 4 INDEX: FIB4 SCORE: 1.64

## 2021-09-22 LAB
SARS-COV-2 RNA RESP QL NAA+PROBE: NOTDETECTED
SPECIMEN SOURCE: NORMAL

## 2021-09-28 ENCOUNTER — ANESTHESIA (OUTPATIENT)
Dept: SURGERY | Facility: MEDICAL CENTER | Age: 71
End: 2021-09-28
Payer: MEDICARE

## 2021-09-28 ENCOUNTER — HOSPITAL ENCOUNTER (OUTPATIENT)
Facility: MEDICAL CENTER | Age: 71
End: 2021-09-28
Attending: SURGERY | Admitting: SURGERY
Payer: MEDICARE

## 2021-09-28 ENCOUNTER — ANESTHESIA EVENT (OUTPATIENT)
Dept: SURGERY | Facility: MEDICAL CENTER | Age: 71
End: 2021-09-28
Payer: MEDICARE

## 2021-09-28 VITALS
BODY MASS INDEX: 24.37 KG/M2 | HEART RATE: 44 BPM | TEMPERATURE: 96.8 F | DIASTOLIC BLOOD PRESSURE: 74 MMHG | HEIGHT: 72 IN | SYSTOLIC BLOOD PRESSURE: 123 MMHG | RESPIRATION RATE: 16 BRPM | WEIGHT: 179.9 LBS | OXYGEN SATURATION: 94 %

## 2021-09-28 PROCEDURE — 700102 HCHG RX REV CODE 250 W/ 637 OVERRIDE(OP): Performed by: ANESTHESIOLOGY

## 2021-09-28 PROCEDURE — A9270 NON-COVERED ITEM OR SERVICE: HCPCS | Performed by: ANESTHESIOLOGY

## 2021-09-28 PROCEDURE — 160025 RECOVERY II MINUTES (STATS): Performed by: SURGERY

## 2021-09-28 PROCEDURE — 160029 HCHG SURGERY MINUTES - 1ST 30 MINS LEVEL 4: Performed by: SURGERY

## 2021-09-28 PROCEDURE — 500002 HCHG ADHESIVE, DERMABOND: Performed by: SURGERY

## 2021-09-28 PROCEDURE — 500868 HCHG NEEDLE, SURGI(VARES): Performed by: SURGERY

## 2021-09-28 PROCEDURE — 700105 HCHG RX REV CODE 258: Performed by: SURGERY

## 2021-09-28 PROCEDURE — 700101 HCHG RX REV CODE 250: Performed by: ANESTHESIOLOGY

## 2021-09-28 PROCEDURE — 502714 HCHG ROBOTIC SURGERY SERVICES: Performed by: SURGERY

## 2021-09-28 PROCEDURE — 160047 HCHG PACU  - EA ADDL 30 MINS PHASE II: Performed by: SURGERY

## 2021-09-28 PROCEDURE — 160002 HCHG RECOVERY MINUTES (STAT): Performed by: SURGERY

## 2021-09-28 PROCEDURE — 160046 HCHG PACU - 1ST 60 MINS PHASE II: Performed by: SURGERY

## 2021-09-28 PROCEDURE — 700101 HCHG RX REV CODE 250: Performed by: SURGERY

## 2021-09-28 PROCEDURE — 160048 HCHG OR STATISTICAL LEVEL 1-5: Performed by: SURGERY

## 2021-09-28 PROCEDURE — 160035 HCHG PACU - 1ST 60 MINS PHASE I: Performed by: SURGERY

## 2021-09-28 PROCEDURE — 501838 HCHG SUTURE GENERAL: Performed by: SURGERY

## 2021-09-28 PROCEDURE — 160009 HCHG ANES TIME/MIN: Performed by: SURGERY

## 2021-09-28 PROCEDURE — 700111 HCHG RX REV CODE 636 W/ 250 OVERRIDE (IP): Performed by: ANESTHESIOLOGY

## 2021-09-28 PROCEDURE — 160041 HCHG SURGERY MINUTES - EA ADDL 1 MIN LEVEL 4: Performed by: SURGERY

## 2021-09-28 PROCEDURE — C1781 MESH (IMPLANTABLE): HCPCS | Performed by: SURGERY

## 2021-09-28 DEVICE — MESH PROGRIP LAPROSCOPIC SELF FIXATING (1/CA): Type: IMPLANTABLE DEVICE | Site: ABDOMEN | Status: FUNCTIONAL

## 2021-09-28 RX ORDER — ONDANSETRON 2 MG/ML
4 INJECTION INTRAMUSCULAR; INTRAVENOUS
Status: COMPLETED | OUTPATIENT
Start: 2021-09-28 | End: 2021-09-28

## 2021-09-28 RX ORDER — OXYCODONE HCL 5 MG/5 ML
10 SOLUTION, ORAL ORAL
Status: DISCONTINUED | OUTPATIENT
Start: 2021-09-28 | End: 2021-09-28 | Stop reason: HOSPADM

## 2021-09-28 RX ORDER — HALOPERIDOL 5 MG/ML
1 INJECTION INTRAMUSCULAR
Status: DISCONTINUED | OUTPATIENT
Start: 2021-09-28 | End: 2021-09-28 | Stop reason: HOSPADM

## 2021-09-28 RX ORDER — KETOROLAC TROMETHAMINE 30 MG/ML
INJECTION, SOLUTION INTRAMUSCULAR; INTRAVENOUS PRN
Status: DISCONTINUED | OUTPATIENT
Start: 2021-09-28 | End: 2021-09-28 | Stop reason: SURG

## 2021-09-28 RX ORDER — GLYCOPYRROLATE 0.2 MG/ML
INJECTION INTRAMUSCULAR; INTRAVENOUS PRN
Status: DISCONTINUED | OUTPATIENT
Start: 2021-09-28 | End: 2021-09-28 | Stop reason: SURG

## 2021-09-28 RX ORDER — HYDRALAZINE HYDROCHLORIDE 20 MG/ML
5 INJECTION INTRAMUSCULAR; INTRAVENOUS
Status: DISCONTINUED | OUTPATIENT
Start: 2021-09-28 | End: 2021-09-28 | Stop reason: HOSPADM

## 2021-09-28 RX ORDER — SODIUM CHLORIDE, SODIUM LACTATE, POTASSIUM CHLORIDE, CALCIUM CHLORIDE 600; 310; 30; 20 MG/100ML; MG/100ML; MG/100ML; MG/100ML
INJECTION, SOLUTION INTRAVENOUS CONTINUOUS
Status: ACTIVE | OUTPATIENT
Start: 2021-09-28 | End: 2021-09-28

## 2021-09-28 RX ORDER — ROCURONIUM BROMIDE 10 MG/ML
INJECTION, SOLUTION INTRAVENOUS PRN
Status: DISCONTINUED | OUTPATIENT
Start: 2021-09-28 | End: 2021-09-28 | Stop reason: SURG

## 2021-09-28 RX ORDER — SODIUM CHLORIDE, SODIUM LACTATE, POTASSIUM CHLORIDE, CALCIUM CHLORIDE 600; 310; 30; 20 MG/100ML; MG/100ML; MG/100ML; MG/100ML
INJECTION, SOLUTION INTRAVENOUS CONTINUOUS
Status: DISCONTINUED | OUTPATIENT
Start: 2021-09-28 | End: 2021-09-28 | Stop reason: HOSPADM

## 2021-09-28 RX ORDER — LIDOCAINE HYDROCHLORIDE 40 MG/ML
SOLUTION TOPICAL PRN
Status: DISCONTINUED | OUTPATIENT
Start: 2021-09-28 | End: 2021-09-28 | Stop reason: SURG

## 2021-09-28 RX ORDER — LABETALOL HYDROCHLORIDE 5 MG/ML
5 INJECTION, SOLUTION INTRAVENOUS
Status: DISCONTINUED | OUTPATIENT
Start: 2021-09-28 | End: 2021-09-28 | Stop reason: HOSPADM

## 2021-09-28 RX ORDER — OXYCODONE HCL 5 MG/5 ML
5 SOLUTION, ORAL ORAL
Status: DISCONTINUED | OUTPATIENT
Start: 2021-09-28 | End: 2021-09-28 | Stop reason: HOSPADM

## 2021-09-28 RX ORDER — DEXAMETHASONE SODIUM PHOSPHATE 4 MG/ML
INJECTION, SOLUTION INTRA-ARTICULAR; INTRALESIONAL; INTRAMUSCULAR; INTRAVENOUS; SOFT TISSUE PRN
Status: DISCONTINUED | OUTPATIENT
Start: 2021-09-28 | End: 2021-09-28 | Stop reason: SURG

## 2021-09-28 RX ORDER — BUPIVACAINE HYDROCHLORIDE AND EPINEPHRINE 5; 5 MG/ML; UG/ML
INJECTION, SOLUTION EPIDURAL; INTRACAUDAL; PERINEURAL
Status: DISCONTINUED | OUTPATIENT
Start: 2021-09-28 | End: 2021-09-28 | Stop reason: HOSPADM

## 2021-09-28 RX ORDER — ONDANSETRON 2 MG/ML
INJECTION INTRAMUSCULAR; INTRAVENOUS PRN
Status: DISCONTINUED | OUTPATIENT
Start: 2021-09-28 | End: 2021-09-28 | Stop reason: SURG

## 2021-09-28 RX ORDER — ACETAMINOPHEN 500 MG
1000 TABLET ORAL ONCE
Status: COMPLETED | OUTPATIENT
Start: 2021-09-28 | End: 2021-09-28

## 2021-09-28 RX ORDER — CEFAZOLIN SODIUM 1 G/3ML
INJECTION, POWDER, FOR SOLUTION INTRAMUSCULAR; INTRAVENOUS PRN
Status: DISCONTINUED | OUTPATIENT
Start: 2021-09-28 | End: 2021-09-28 | Stop reason: SURG

## 2021-09-28 RX ADMIN — FENTANYL CITRATE 50 MCG: 50 INJECTION, SOLUTION INTRAMUSCULAR; INTRAVENOUS at 10:52

## 2021-09-28 RX ADMIN — CEFAZOLIN 2 G: 330 INJECTION, POWDER, FOR SOLUTION INTRAMUSCULAR; INTRAVENOUS at 10:34

## 2021-09-28 RX ADMIN — ACETAMINOPHEN 1000 MG: 500 TABLET, FILM COATED ORAL at 09:57

## 2021-09-28 RX ADMIN — KETOROLAC TROMETHAMINE 15 MG: 30 INJECTION, SOLUTION INTRAMUSCULAR at 11:11

## 2021-09-28 RX ADMIN — DEXAMETHASONE SODIUM PHOSPHATE 8 MG: 4 INJECTION, SOLUTION INTRAMUSCULAR; INTRAVENOUS at 10:36

## 2021-09-28 RX ADMIN — ONDANSETRON 4 MG: 2 INJECTION INTRAMUSCULAR; INTRAVENOUS at 12:41

## 2021-09-28 RX ADMIN — LIDOCAINE HYDROCHLORIDE 4 ML: 40 SOLUTION TOPICAL at 10:36

## 2021-09-28 RX ADMIN — ROCURONIUM BROMIDE 50 MG: 10 INJECTION, SOLUTION INTRAVENOUS at 10:36

## 2021-09-28 RX ADMIN — LIDOCAINE HYDROCHLORIDE 0.5 ML: 10 INJECTION, SOLUTION INFILTRATION; PERINEURAL at 09:21

## 2021-09-28 RX ADMIN — ONDANSETRON 4 MG: 2 INJECTION INTRAMUSCULAR; INTRAVENOUS at 11:13

## 2021-09-28 RX ADMIN — GLYCOPYRROLATE 0.1 MG: 0.2 INJECTION INTRAMUSCULAR; INTRAVENOUS at 10:42

## 2021-09-28 RX ADMIN — FENTANYL CITRATE 100 MCG: 50 INJECTION, SOLUTION INTRAMUSCULAR; INTRAVENOUS at 10:34

## 2021-09-28 RX ADMIN — SODIUM CHLORIDE, POTASSIUM CHLORIDE, SODIUM LACTATE AND CALCIUM CHLORIDE: 600; 310; 30; 20 INJECTION, SOLUTION INTRAVENOUS at 09:21

## 2021-09-28 RX ADMIN — GLYCOPYRROLATE 0.3 MG: 0.2 INJECTION INTRAMUSCULAR; INTRAVENOUS at 11:11

## 2021-09-28 RX ADMIN — GLYCOPYRROLATE 0.1 MG: 0.2 INJECTION INTRAMUSCULAR; INTRAVENOUS at 10:39

## 2021-09-28 RX ADMIN — PROPOFOL 120 MG: 10 INJECTION, EMULSION INTRAVENOUS at 10:36

## 2021-09-28 ASSESSMENT — PAIN DESCRIPTION - PAIN TYPE: TYPE: SURGICAL PAIN

## 2021-09-28 ASSESSMENT — FIBROSIS 4 INDEX: FIB4 SCORE: 1.74

## 2021-09-28 NOTE — OR SURGEON
Immediate Post OP Note    PreOp Diagnosis: rih possible lih      PostOp Diagnosis: indirect LIH      Procedure(s):  REPAIR, HERNIA, INGUINAL, ROBOT-ASSISTED, USING DA NEYDA XI - RIGHT WITH MESH PLACEMENT, POSSIBLE LEFT WITH MESH PLACEMENT IF INDICATED - Wound Class: Clean    Surgeon(s):  MARCIAL Lamb M.D.    Anesthesiologist/Type of Anesthesia:  Anesthesiologist: Jon Carson M.D./General    Surgical Staff:  Circulator: Heike Hester R.N.  Scrub Person: Chris Mariscal    Specimens removed if any:  * No specimens in log *    Estimated Blood Loss: 5    Findings: same    Complications: 0        9/28/2021 11:12 AM Dev Coombs M.D.

## 2021-09-28 NOTE — OR NURSING
1124: Patient arrived from OR via bed.  Scope sites x3 CDI, approximated. Ice pack placed.     Sedation/Resp Status: Spontaneous eye opening to verabl/gag reflex returned - oral airway out upon arrival. Respirations spontaneous and non-labored.     HR 54 SB; VSS on 6L 02 via mask.    1130: Cont drowsy but stable, no changes.    1145: Denies nausea/pain, scope sites CDI. Elisha sips of clears. POSS 2.     1200: Meets criteria to transfer to stage II.     1205: Scope sites x3 CDI. Report to Britni ROBLEDO.

## 2021-09-28 NOTE — ANESTHESIA TIME REPORT
Anesthesia Start and Stop Event Times     Date Time Event    9/28/2021 1020 Ready for Procedure     1029 Anesthesia Start     1125 Anesthesia Stop        Responsible Staff  09/28/21    Name Role Begin End    Jon Carson M.D. Anesth 1029 1125        Preop Diagnosis (Free Text):  Pre-op Diagnosis     RIGHT INGUINAL HERNIA        Preop Diagnosis (Codes):    Premium Reason  Non-Premium    Comments:

## 2021-09-28 NOTE — ANESTHESIA PREPROCEDURE EVALUATION
Right inguinal hernia    Relevant Problems   Other   (positive) Dyslipidemia     Very active METs<4 denies CP/SOB    Physical Exam    Airway   Mallampati: II  TM distance: >3 FB  Neck ROM: full       Cardiovascular - normal exam  Rhythm: regular  Rate: normal  (-) murmur     Dental - normal exam           Pulmonary - normal exam  Breath sounds clear to auscultation     Abdominal    Neurological - normal exam                 Anesthesia Plan    ASA 2       Plan - general       Airway plan will be ETT          Induction: intravenous    Postoperative Plan: Postoperative administration of opioids is intended.    Pertinent diagnostic labs and testing reviewed    Informed Consent:    Anesthetic plan and risks discussed with patient.    Use of blood products discussed with: patient whom consented to blood products.

## 2021-09-28 NOTE — OR NURSING
1345: Report from VENKAT Ryder, Patient resting.    1350: Wife at bedside.    1405D/Grant to care of family post uneventful stay in PACU 2.:

## 2021-09-28 NOTE — OR NURSING
"1220 PT DRESSED, TRANSFERRED TO RECLINER.  PT DRESSED, TRANSFERRED TO RECLINER.      1230 VSS.     1237 PT REPORTS NAUSEA. PAIN TO R LOWER ABDOMEN 3/10 AND TOLERABLE. ICE PACK TO ABDOMEN. MEDICATE FOR NAUSEA. WIFE AT CHAIRSIDE. LAP STABS X3 CDI, APPROX WITH DERMABOND. PT GIVEN QUEEZ EZZ FOR NAUSEA.     1245 HR 38, HR BELOW BASELINE OF 44BPM,  PT REMAINS NAUSEATED. PT WANTS TO REST IN STAGE 2.  WIFE WILL LEAVE AND RETURN AFTER EATING LUNCH APPROX 30 MINUTES.      1300 PT MADE COMFORTABLE ON RECLINER WITH 2 WARM BLANKETS AND PILLOW. WILL CONTINUE TO MONITOR NAUSEA. PT RESTING WITH EYES CLOSED, EASY TO ROUSE, BACK TO REST.     1320 PT REPORTS \"VERY LITTLE\" ABDOMINAL PAIN, \"SLIGHT\" NAUSEA WITH UPPER SHOULDER DISCOMFORT.     1334 PT TOLERATING GINGER ALE AND 2 STAR BURST CANDIES.     1336 REPORT GIVEN TO MONICA ROBLEDO TO ASSUME CARE OF THIS PT.   "

## 2021-09-28 NOTE — ANESTHESIA POSTPROCEDURE EVALUATION
Patient: Xander Yuan    Procedure Summary     Date: 09/28/21 Room / Location:  OR  / SURGERY [facility]    Anesthesia Start: 1029 Anesthesia Stop: 1125    Procedure: REPAIR, HERNIA, INGUINAL, ROBOT-ASSISTED, USING DA NEYDA XI - RIGHT WITH MESH PLACEMENT (Abdomen) Diagnosis: (RIGHT INGUINAL HERNIA)    Surgeons: Dev Coombs M.D. Responsible Provider: Jon Carson M.D.    Anesthesia Type: general ASA Status: 2          Final Anesthesia Type: general  Last vitals  BP   Blood Pressure : 123/74    Temp   36 °C (96.8 °F)    Pulse   (!) 44   Resp   16    SpO2   94 %      Anesthesia Post Evaluation    Patient location during evaluation: PACU  Patient participation: complete - patient participated  Level of consciousness: awake and alert    Airway patency: patent  Anesthetic complications: no  Cardiovascular status: hemodynamically stable  Respiratory status: acceptable  Hydration status: euvolemic    PONV: none          No complications documented.     Nurse Pain Score: 2 (NPRS)

## 2021-09-28 NOTE — OP REPORT
DATE OF SERVICE:  09/28/2021     PREOPERATIVE DIAGNOSIS:  Symptomatic right inguinal hernia, possible left   inguinal hernia.     POSTOPERATIVE DIAGNOSIS:  Indirect right inguinal hernia.     OPERATION:  Robotic-assisted laparoscopic transabdominal preperitoneal repair   of right inguinal hernia with mesh, ProGrip implantation.     SURGEON:  Dev Coombs MD     ANESTHESIOLOGIST:  Jon Carson MD     OPERATIVE NOTE:  The patient is a 70 years of age, presents with a symptomatic   right inguinal hernia.  He was felt potentially have a left inguinal hernia.    He was recommended to undergo elective repair.  The risks, possible   complications of which were carefully explained to him in detail.  He opted to   proceed with robotic-assisted laparoscopic transabdominal preperitoneal   repair.  The patient received detailed postoperative care instructions.  He   had felt a prescription for Norco, for postoperative pain as a component of   multimodal postoperative analgesia.  He signed consents for surgery and   anesthesia.  The patient had an opportunity to have all questions answered.    He received prophylactic antibiotics, had sequential stockings applied as   anti-embolism prophylaxis.  He was taken to the operating room and placed   under anesthesia by Dr. Carson.  Once anesthetized, his abdomen shaved and   prepped with ChloraPrep solution, sterile drapes were applied and a time-out   was affected.  A solution of 0.5% Marcaine with epinephrine was liberally   infiltrated in all incisions.  Incision was made in the left upper quadrant.    The fascia was elevated.  Veress needle was inserted.  Saline drop test was   permissive to proceed with step pneumo insufflation.  Once fully pneumo   insufflated, an 8 mm trocar was placed.  This facilitated video laparoscopy   and placement of an 8 mm trocar to the right of the lower epigastrium in the   midline and one in the right upper quadrant.  The patient was  positioned in   Trendelenburg.  The patient's pelvis was inspected.  There was no evidence of   a left inguinal hernia.  He had a fairly large right indirect inguinal hernia.    The patient had a transversus abdominis block performed with 10 mL of   Marcaine.  The patient had the da Natividad Xi robot brought in and was docked.    Instrumentation and camera introduced.  Dr. Coombs retired to the console.  A   transverse incision was made above the pelvic outlet from medial to lateral   through the peritoneum.  This facilitated dissection of a peritoneal pocket.    This proceeded cephalad caudally.  The patient had dissection carried down   across the pubic symphysis below Maurice's ligament up to the level of femoral   vein.  Lateral dissection was carried down well across the medial border of   the psoas muscle.  The patient had an indirect sac, which was carefully   reduced.  This may have contained a small bladder diverticulum.  The patient   had the peritoneum widely mobilized and  from the cord structures   until these cord structures lay flat against the pelvic outlet.  Once it had   been completely mobilized, a 10 x 15 cm ProGrip mesh was inserted, unfurled   and deployed against the pelvic outlet.  The peritoneum was then closed over   using running 2-0 Stratafix suture.  Once the peritoneum has been completely   closed over the mesh, the suture needle was  from the remaining   suture and retrieved.  The patient's procedure was completed, then by removal   of the instrumentation, undocking the robot, removal of the trocars and   closure of the incisions using running 4-0 Monocryl suture in the subcuticular   layers.  The wounds were sealed with Dermabond.  The patient was awakened,   extubated, and taken to recovery room in stable satisfactory condition.  His   procedure was uncomplicated.  It is anticipated, the patient will be treated   on an ambulatory basis.  He was asked to return to see me  in the office in one   week, sooner if there are problems in the interim.  If problems should arise   in the interim from discharge to follow up.  The patient was instructed to   call promptly.        ______________________________  MD YUNG SHEEHAN/JANEL    DD:  09/28/2021 12:22  DT:  09/28/2021 13:31    Job#:  [id number]    CC:Jon Carson MD(User)

## 2021-09-28 NOTE — ANESTHESIA PROCEDURE NOTES
Airway    Date/Time: 9/28/2021 10:37 AM  Performed by: Jon Carson M.D.  Authorized by: Jon Carson M.D.     Location:  OR  Urgency:  Elective  Difficult Airway: No    Indications for Airway Management:  Anesthesia      Spontaneous Ventilation: absent    Sedation Level:  Deep  Preoxygenated: Yes    Patient Position:  Sniffing  Mask Difficulty Assessment:  2 - vent by mask + OA or adjuvant +/- NMBA  Final Airway Type:  Endotracheal airway  Final Endotracheal Airway:  ETT  Cuffed: Yes    Technique Used for Successful ETT Placement:  Direct laryngoscopy    Insertion Site:  Oral  Blade Type:  Gela  Laryngoscope Blade/Videolaryngoscope Blade Size:  3  ETT Size (mm):  7.5  Measured from:  Teeth  ETT to Teeth (cm):  23  Placement Verified by: auscultation and capnometry    Cormack-Lehane Classification:  Grade I - full view of glottis  Number of Attempts at Approach:  1   Atraumatic DLx1

## 2021-09-28 NOTE — DISCHARGE INSTRUCTIONS
ACTIVITY: Rest and take it easy for the first 24 hours.  A responsible adult is recommended to remain with you during that time.  It is normal to feel sleepy.  We encourage you to not do anything that requires balance, judgment or coordination.    MILD FLU-LIKE SYMPTOMS ARE NORMAL. YOU MAY EXPERIENCE GENERALIZED MUSCLE ACHES, THROAT IRRITATION, HEADACHE AND/OR SOME NAUSEA.    FOR 24 HOURS DO NOT:  Drive, operate machinery or run household appliances.  Drink beer or alcoholic beverages.   Make important decisions or sign legal documents.    SPECIAL INSTRUCTIONS:   Laparoscopic Inguinal Hernia Repair, Adult, Care After  This sheet gives you information about how to care for yourself after your procedure. Your health care provider may also give you more specific instructions. If you have problems or questions, contact your health care provider.    What can I expect after the procedure?  After the procedure, it is common to have:  · Pain.  · Swelling and bruising around the incision area.  · Scrotal swelling, in men.  · Some fluid or blood draining from your incisions.    Follow these instructions at home:    Incision care  · Follow instructions from your health care provider about how to take care of your incisions. Make sure you:  ? Wash your hands with soap and water before you change your bandage (dressing). If soap and water are not available, use hand .  ? Change your dressing as told by your health care provider.  ? Leave stitches (sutures), skin glue, or adhesive strips in place. These skin closures may need to stay in place for 2 weeks or longer. If adhesive strip edges start to loosen and curl up, you may trim the loose edges. Do not remove adhesive strips completely unless your health care provider tells you to do that.  · Check your incision area every day for signs of infection. Check for:  ? More redness, swelling, or pain.  ? More fluid or blood.  ? Warmth.  ? Pus or a bad smell.  · Wear loose,  soft clothing while your incisions heal.    Driving  · Do not drive or use heavy machinery while taking prescription pain medicine.  · Do not drive for 24 hours if you were given a medicine to help you relax (sedative) during your procedure.    Activity  · Do not lift anything that is heavier than 10 lb (4.5 kg), or the limit that you are told, until your health care provider says that it is safe.  · Ask your health care provider what activities are safe for you. A lot of activity during the first week after surgery can increase pain and swelling. For 1 week after your procedure:  ? Avoid activities that take a lot of effort, such as exercise or sports.  ? You may walk and climb stairs as needed for daily activity, but avoid long walks or climbing stairs for exercise.  Managing pain and swelling    · Put ice on painful or swollen areas:  ? Put ice in a plastic bag.  ? Place a towel between your skin and the bag.  ? Leave the ice on for 20 minutes, 2-3 times a day.    General instructions  · Do not take baths, swim, or use a hot tub until your health care provider approves. Ask your health care provider if you may take showers. You may only be allowed to take sponge baths.  · Take over-the-counter and prescription medicines only as told by your health care provider.  · To prevent or treat constipation while you are taking prescription pain medicine, your health care provider may recommend that you:  ? Drink enough fluid to keep your urine pale yellow.  ? Take over-the-counter or prescription medicines.  ? Eat foods that are high in fiber, such as fresh fruits and vegetables, whole grains, and beans.  ? Limit foods that are high in fat and processed sugars, such as fried and sweet foods.  · Do not use any products that contain nicotine or tobacco, such as cigarettes and e-cigarettes. If you need help quitting, ask your health care provider.  · Drink enough fluid to keep your urine pale yellow.  · Keep all follow-up  visits as told by your health care provider. This is important.    Contact a health care provider if:  · You have more redness, swelling, or pain around your incisions or your groin area.  · You have more swelling in your scrotum.  · You have more fluid or blood coming from your incisions.  · Your incisions feel warm to the touch.  · You have severe pain and medicines do not help.  · You have abdominal pain or swelling.  · You cannot eat or drink without vomiting.  · You cannot urinate or pass a bowel movement.  · You faint.  · You feel dizzy.  · You have nausea and vomiting.  · You have a fever.    Get help right away if:  · You have pus or a bad smell coming from your incisions.  · You have chest pain.  · You have problems breathing.    Summary  · Pain, swelling, and bruising are common after the procedure.  · Check your incision area every day for signs of infection, such as more redness, swelling, or pain.  · Put ice on painful or swollen areas for 20 minutes, 2-3 times a day.  This information is not intended to replace advice given to you by your health care provider. Make sure you discuss any questions you have with your health care provider.  Document Released: 03/29/2018 Document Revised: 12/21/2018 Document Reviewed: 03/29/2018  [employer] Patient Education © 2020 [employer]    DIET: To avoid nausea, slowly advance diet as tolerated, avoiding spicy or greasy foods for the first day.  Add more substantial food to your diet according to your physician's instructions.  Babies can be fed formula or breast milk as soon as they are hungry.  INCREASE FLUIDS AND FIBER TO AVOID CONSTIPATION.    SURGICAL DRESSING/BATHING: Keep scope sites clean and dry as instructed by Dr Coombs. No soaking incision sites in a bath or hot tub.     FOLLOW-UP APPOINTMENT:  A follow-up appointment should be arranged with your doctor; call [phone] to schedule.    You should CALL YOUR PHYSICIAN if you develop:  Fever greater than  101 degrees F.  Pain not relieved by medication, or persistent nausea or vomiting.  Excessive bleeding (blood soaking through dressing) or unexpected drainage from the wound.  Extreme redness or swelling around the incision site, drainage of pus or foul smelling drainage.  Inability to urinate or empty your bladder within 8 hours.  Problems with breathing or chest pain.    You should call 911 if you develop problems with breathing or chest pain.  If you are unable to contact your doctor or surgical center, you should go to the nearest emergency room or urgent care center.      Dr Coombs's telephone #: [phone]    If any questions arise, call your doctor.  If your doctor is not available, please feel free to call the Surgical Center at [phone]. The Contact Center is open Monday through Friday 7AM to 5PM and may speak to a nurse at [phone], or toll free at [phone].     A registered nurse may call you a few days after your surgery to see how you are doing after your procedure.    MEDICATIONS: Resume taking daily medication.  Take prescribed pain medication with food.  If no medication is prescribed, you may take non-aspirin pain medication if needed.  PAIN MEDICATION CAN BE VERY CONSTIPATING.  Take a stool softener or laxative such as senokot, pericolace, or milk of magnesia if needed.    NO ORAL PAIN MEDICATIONS GIVEN IN RECOVERY.     If your physician has prescribed pain medication that includes Acetaminophen (Tylenol), do not take additional Acetaminophen (Tylenol) while taking the prescribed medication.    Depression / Suicide Risk    As you are discharged from this [facility] facility, it is important to learn how to keep safe from harming yourself.    Recognize the warning signs:  · Abrupt changes in personality, positive or negative- including increase in energy   · Giving away possessions  · Change in eating patterns- significant weight changes-  positive or negative  · Change in  sleeping patterns- unable to sleep or sleeping all the time   · Unwillingness or inability to communicate  · Depression  · Unusual sadness, discouragement and loneliness  · Talk of wanting to die  · Neglect of personal appearance   · Rebelliousness- reckless behavior  · Withdrawal from people/activities they love  · Confusion- inability to concentrate     If you or a loved one observes any of these behaviors or has concerns about self-harm, here's what you can do:  · Talk about it- your feelings and reasons for harming yourself  · Remove any means that you might use to hurt yourself (examples: pills, rope, extension cords, firearm)  · Get professional help from the community (Mental Health, Substance Abuse, psychological counseling)  · Do not be alone:Call your Safe Contact- someone whom you trust who will be there for you.  · Call your local CRISIS HOTLINE 021-2662 or 190-326-3051  · Call your local Children's Mobile Crisis Response Team [place] (629) 578-1533 or www.kapturem  · Call the toll free National Suicide Prevention Hotlines   · National Suicide Prevention Lifeline 915-015-UMKR (2332)  · National Hope Line Network 800-SUICIDE (060-1165)

## 2022-03-02 ENCOUNTER — PATIENT MESSAGE (OUTPATIENT)
Dept: MEDICAL GROUP | Facility: MEDICAL CENTER | Age: 72
End: 2022-03-02
Payer: MEDICARE

## 2022-03-02 DIAGNOSIS — Z11.52 ENCOUNTER FOR SCREENING FOR COVID-19: ICD-10-CM

## 2022-03-04 LAB
SARS-COV-2 AB SERPL IA-ACNC: <0.8 U/ML
SARS-COV-2 AB SERPL-IMP: NEGATIVE

## 2022-03-17 ENCOUNTER — PATIENT MESSAGE (OUTPATIENT)
Dept: HEALTH INFORMATION MANAGEMENT | Facility: OTHER | Age: 72
End: 2022-03-17

## 2022-06-09 ENCOUNTER — TELEPHONE (OUTPATIENT)
Dept: HEALTH INFORMATION MANAGEMENT | Facility: OTHER | Age: 72
End: 2022-06-09

## 2022-07-22 SDOH — ECONOMIC STABILITY: HOUSING INSECURITY
IN THE LAST 12 MONTHS, WAS THERE A TIME WHEN YOU DID NOT HAVE A STEADY PLACE TO SLEEP OR SLEPT IN A SHELTER (INCLUDING NOW)?: NO

## 2022-07-22 SDOH — HEALTH STABILITY: PHYSICAL HEALTH: ON AVERAGE, HOW MANY MINUTES DO YOU ENGAGE IN EXERCISE AT THIS LEVEL?: 30 MIN

## 2022-07-22 SDOH — HEALTH STABILITY: MENTAL HEALTH
STRESS IS WHEN SOMEONE FEELS TENSE, NERVOUS, ANXIOUS, OR CAN'T SLEEP AT NIGHT BECAUSE THEIR MIND IS TROUBLED. HOW STRESSED ARE YOU?: TO SOME EXTENT

## 2022-07-22 SDOH — ECONOMIC STABILITY: FOOD INSECURITY: WITHIN THE PAST 12 MONTHS, YOU WORRIED THAT YOUR FOOD WOULD RUN OUT BEFORE YOU GOT MONEY TO BUY MORE.: NEVER TRUE

## 2022-07-22 SDOH — ECONOMIC STABILITY: HOUSING INSECURITY: IN THE LAST 12 MONTHS, HOW MANY PLACES HAVE YOU LIVED?: 2

## 2022-07-22 SDOH — ECONOMIC STABILITY: TRANSPORTATION INSECURITY
IN THE PAST 12 MONTHS, HAS THE LACK OF TRANSPORTATION KEPT YOU FROM MEDICAL APPOINTMENTS OR FROM GETTING MEDICATIONS?: NO

## 2022-07-22 SDOH — ECONOMIC STABILITY: INCOME INSECURITY: HOW HARD IS IT FOR YOU TO PAY FOR THE VERY BASICS LIKE FOOD, HOUSING, MEDICAL CARE, AND HEATING?: NOT VERY HARD

## 2022-07-22 SDOH — ECONOMIC STABILITY: FOOD INSECURITY: WITHIN THE PAST 12 MONTHS, THE FOOD YOU BOUGHT JUST DIDN'T LAST AND YOU DIDN'T HAVE MONEY TO GET MORE.: NEVER TRUE

## 2022-07-22 SDOH — ECONOMIC STABILITY: TRANSPORTATION INSECURITY
IN THE PAST 12 MONTHS, HAS LACK OF RELIABLE TRANSPORTATION KEPT YOU FROM MEDICAL APPOINTMENTS, MEETINGS, WORK OR FROM GETTING THINGS NEEDED FOR DAILY LIVING?: NO

## 2022-07-22 SDOH — ECONOMIC STABILITY: INCOME INSECURITY: IN THE LAST 12 MONTHS, WAS THERE A TIME WHEN YOU WERE NOT ABLE TO PAY THE MORTGAGE OR RENT ON TIME?: NO

## 2022-07-22 SDOH — ECONOMIC STABILITY: TRANSPORTATION INSECURITY
IN THE PAST 12 MONTHS, HAS LACK OF TRANSPORTATION KEPT YOU FROM MEETINGS, WORK, OR FROM GETTING THINGS NEEDED FOR DAILY LIVING?: NO

## 2022-07-22 SDOH — HEALTH STABILITY: PHYSICAL HEALTH: ON AVERAGE, HOW MANY DAYS PER WEEK DO YOU ENGAGE IN MODERATE TO STRENUOUS EXERCISE (LIKE A BRISK WALK)?: 5 DAYS

## 2022-07-22 ASSESSMENT — LIFESTYLE VARIABLES
AUDIT-C TOTAL SCORE: 2
SKIP TO QUESTIONS 9-10: 1
HOW MANY STANDARD DRINKS CONTAINING ALCOHOL DO YOU HAVE ON A TYPICAL DAY: 1 OR 2
HOW OFTEN DO YOU HAVE A DRINK CONTAINING ALCOHOL: 2-4 TIMES A MONTH
HOW OFTEN DO YOU HAVE SIX OR MORE DRINKS ON ONE OCCASION: NEVER

## 2022-07-22 ASSESSMENT — SOCIAL DETERMINANTS OF HEALTH (SDOH)
HOW HARD IS IT FOR YOU TO PAY FOR THE VERY BASICS LIKE FOOD, HOUSING, MEDICAL CARE, AND HEATING?: NOT VERY HARD
HOW OFTEN DO YOU GET TOGETHER WITH FRIENDS OR RELATIVES?: MORE THAN THREE TIMES A WEEK
HOW OFTEN DO YOU ATTEND CHURCH OR RELIGIOUS SERVICES?: MORE THAN 4 TIMES PER YEAR
HOW OFTEN DO YOU HAVE SIX OR MORE DRINKS ON ONE OCCASION: NEVER
HOW OFTEN DO YOU ATTENT MEETINGS OF THE CLUB OR ORGANIZATION YOU BELONG TO?: MORE THAN 4 TIMES PER YEAR
HOW OFTEN DO YOU HAVE A DRINK CONTAINING ALCOHOL: 2-4 TIMES A MONTH
HOW OFTEN DO YOU GET TOGETHER WITH FRIENDS OR RELATIVES?: MORE THAN THREE TIMES A WEEK
DO YOU BELONG TO ANY CLUBS OR ORGANIZATIONS SUCH AS CHURCH GROUPS UNIONS, FRATERNAL OR ATHLETIC GROUPS, OR SCHOOL GROUPS?: YES
WITHIN THE PAST 12 MONTHS, YOU WORRIED THAT YOUR FOOD WOULD RUN OUT BEFORE YOU GOT THE MONEY TO BUY MORE: NEVER TRUE
IN A TYPICAL WEEK, HOW MANY TIMES DO YOU TALK ON THE PHONE WITH FAMILY, FRIENDS, OR NEIGHBORS?: MORE THAN THREE TIMES A WEEK
HOW OFTEN DO YOU ATTEND CHURCH OR RELIGIOUS SERVICES?: MORE THAN 4 TIMES PER YEAR
IN A TYPICAL WEEK, HOW MANY TIMES DO YOU TALK ON THE PHONE WITH FAMILY, FRIENDS, OR NEIGHBORS?: MORE THAN THREE TIMES A WEEK
HOW OFTEN DO YOU ATTENT MEETINGS OF THE CLUB OR ORGANIZATION YOU BELONG TO?: MORE THAN 4 TIMES PER YEAR
DO YOU BELONG TO ANY CLUBS OR ORGANIZATIONS SUCH AS CHURCH GROUPS UNIONS, FRATERNAL OR ATHLETIC GROUPS, OR SCHOOL GROUPS?: YES
HOW MANY DRINKS CONTAINING ALCOHOL DO YOU HAVE ON A TYPICAL DAY WHEN YOU ARE DRINKING: 1 OR 2

## 2022-07-25 ENCOUNTER — HOSPITAL ENCOUNTER (OUTPATIENT)
Dept: LAB | Facility: MEDICAL CENTER | Age: 72
End: 2022-07-25
Attending: FAMILY MEDICINE
Payer: MEDICARE

## 2022-07-25 ENCOUNTER — OFFICE VISIT (OUTPATIENT)
Dept: MEDICAL GROUP | Facility: MEDICAL CENTER | Age: 72
End: 2022-07-25
Payer: MEDICARE

## 2022-07-25 VITALS
SYSTOLIC BLOOD PRESSURE: 98 MMHG | RESPIRATION RATE: 18 BRPM | HEIGHT: 72 IN | DIASTOLIC BLOOD PRESSURE: 58 MMHG | BODY MASS INDEX: 24.43 KG/M2 | TEMPERATURE: 97.5 F | HEART RATE: 50 BPM | WEIGHT: 180.34 LBS | OXYGEN SATURATION: 94 %

## 2022-07-25 DIAGNOSIS — Z23 NEED FOR VACCINATION: ICD-10-CM

## 2022-07-25 DIAGNOSIS — Z13.1 SCREENING FOR DIABETES MELLITUS: ICD-10-CM

## 2022-07-25 DIAGNOSIS — N18.31 STAGE 3A CHRONIC KIDNEY DISEASE: ICD-10-CM

## 2022-07-25 DIAGNOSIS — Z00.00 HEALTHCARE MAINTENANCE: ICD-10-CM

## 2022-07-25 DIAGNOSIS — Z13.6 SCREENING FOR ISCHEMIC HEART DISEASE: ICD-10-CM

## 2022-07-25 PROBLEM — E78.5 DYSLIPIDEMIA: Status: RESOLVED | Noted: 2018-02-28 | Resolved: 2022-07-25

## 2022-07-25 PROBLEM — R53.83 LETHARGY: Status: RESOLVED | Noted: 2018-12-18 | Resolved: 2022-07-25

## 2022-07-25 PROBLEM — L03.032 PARONYCHIA OF TOE OF LEFT FOOT: Status: RESOLVED | Noted: 2019-06-14 | Resolved: 2022-07-25

## 2022-07-25 PROCEDURE — 80061 LIPID PANEL: CPT

## 2022-07-25 PROCEDURE — G0439 PPPS, SUBSEQ VISIT: HCPCS | Performed by: FAMILY MEDICINE

## 2022-07-25 PROCEDURE — 80053 COMPREHEN METABOLIC PANEL: CPT

## 2022-07-25 PROCEDURE — 36415 COLL VENOUS BLD VENIPUNCTURE: CPT

## 2022-07-25 ASSESSMENT — PATIENT HEALTH QUESTIONNAIRE - PHQ9: CLINICAL INTERPRETATION OF PHQ2 SCORE: 0

## 2022-07-25 ASSESSMENT — FIBROSIS 4 INDEX: FIB4 SCORE: 1.76

## 2022-07-25 ASSESSMENT — ACTIVITIES OF DAILY LIVING (ADL): BATHING_REQUIRES_ASSISTANCE: 0

## 2022-07-25 ASSESSMENT — ENCOUNTER SYMPTOMS: GENERAL WELL-BEING: GOOD

## 2022-07-25 NOTE — PROGRESS NOTES
Chief Complaint   Patient presents with   • Annual Exam         HPI:  Xander is a 71 y.o. here for Medicare Annual Wellness Visit    Healthcare maintenance  Lipids: ordered.  Fasting Glucose: ordered.   Colonoscopy: Done 5/22/2017 with 10 year recall.    Stage 3 chronic kidney disease (HCC)  Noted on previous labs.       Patient Active Problem List    Diagnosis Date Noted   • Sciatica of right side 06/15/2020   • Trochanteric bursitis of right hip 06/15/2020   • Right inguinal hernia 06/14/2019   • Venous insufficiency 02/28/2018   • Healthcare maintenance 02/28/2018   • Stage 3 chronic kidney disease (HCC) 12/03/2015       Current Outpatient Medications   Medication Sig Dispense Refill   • DOXYLAMINE-DM PO Take 25 mg by mouth.     • ASPIRIN ADULT PO Take 400 mg by mouth.     • Melatonin CR 3 MG Tab CR 1 tablet PO nightly 90 Tab 3   • ibuprofen (MOTRIN) 600 MG Tab Take 600 mg by mouth every 6 hours as needed.       No current facility-administered medications for this visit.        Patient is taking medications as noted in medication list.  Current supplements as per medication list.     Allergies: Patient has no known allergies.    Current social contact/activities: Pt stays in touch with family and friends. Pt. Attends Muslim and gathers with friends.     Is patient current with immunizations? No, needs PCV-20. Not interested today.    He  reports that he has never smoked. He has never used smokeless tobacco. He reports current alcohol use. He reports that he does not use drugs.  Counseling given: Not Answered        DPA/Advanced directive: Patient does not have an Advanced Directive.  A packet and workshop information was given on Advanced Directives.    ROS:    Gait: Uses no assistive device   Ostomy: No   Other tubes: No   Amputations: No   Chronic oxygen use No   Last eye exam 1 year ago   Wears hearing aids: Yes   : Denies any urinary leakage during the last 6 months      Depression Screening  Little  interest or pleasure in doing things?  0 - not at all  Feeling down, depressed, or hopeless? 0 - not at all  Patient Health Questionnaire Score: 0    If depressive symptoms identified deferred to follow up visit unless specifically addressed in assessment and plan.    Interpretation of PHQ-9 Total Score   Score Severity   1-4 No Depression   5-9 Mild Depression   10-14 Moderate Depression   15-19 Moderately Severe Depression   20-27 Severe Depression    Screening for Cognitive Impairment  Three Minute Recall (daughter, heaven, mountain)  3/3    Scooter clock face with all 12 numbers and set the hands to show 10 past 11.  Yes 5/5  If cognitive concerns identified, deferred for follow up unless specifically addressed in assessment and plan.    Fall Risk Assessment  Has the patient had two or more falls in the last year or any fall with injury in the last year?  No  If fall risk identified, deferred for follow up unless specifically addressed in assessment and plan.    Safety Assessment  Throw rugs on floor.  Yes  Handrails on all stairs.  Yes  Good lighting in all hallways.  Yes  Difficulty hearing.  Yes  Patient counseled about all safety risks that were identified.    Functional Assessment ADLs  Are there any barriers preventing you from cooking for yourself or meeting nutritional needs?  No.    Are there any barriers preventing you from driving safely or obtaining transportation?  No.    Are there any barriers preventing you from using a telephone or calling for help?  No.    Are there any barriers preventing you from shopping?  No.    Are there any barriers preventing you from taking care of your own finances?  No.    Are there any barriers preventing you from managing your medications?  No.    Are there any barriers preventing you from showering, bathing or dressing yourself?  No.    Are you currently engaging in any exercise or physical activity?  Yes.  Pt goes surfing, walks and hikes.  Pt takes care of his home  and yard.   What is your perception of your health?  Good.    Health Maintenance Summary          Overdue - HEPATITIS C SCREENING (Once) Overdue - never done    No completion history exists for this topic.          Overdue - COVID-19 Vaccine (1) Overdue - never done    No completion history exists for this topic.          Overdue - IMM ZOSTER VACCINES (1 of 2) Overdue - never done    No completion history exists for this topic.          Overdue - IMM PNEUMOCOCCAL VACCINE: 65+ Years (1 - PCV) Overdue - never done    No completion history exists for this topic.          IMM INFLUENZA (1) Next due on 9/1/2022 01/23/2015  Imm Admin: INFLUENZA TIV (IM)    01/23/2015  Imm Admin: Influenza (IM) Preservative Free - HISTORICAL DATA          Annual Wellness Visit (Every 366 Days) Next due on 7/26/2023 07/25/2022  Subsequent Annual Wellness Visit - Includes PPPS ()    06/29/2021  Subsequent Annual Wellness Visit - Includes PPPS ()    06/15/2020  Subsequent Annual Wellness Visit - Includes PPPS ()          IMM DTaP/Tdap/Td Vaccine (2 - Td or Tdap) Next due on 1/23/2025 01/23/2015  Imm Admin: Tdap Vaccine    01/12/1999  Imm Admin: TD Vaccine          COLORECTAL CANCER SCREENING (COLONOSCOPY - Every 10 Years) Next due on 5/16/2027 05/16/2017  REFERRAL TO GI FOR COLONOSCOPY    12/22/2015  OCCULT BLOOD FECES IMMUNOASSAY          IMM HEP B VACCINE (Series Information) Aged Out    11/10/2016  Imm Admin: Hepatitis B Vaccine (Adol/Adult)    05/12/2016  Imm Admin: Hepatitis B Vaccine (Adol/Adult)    03/28/2016  Imm Admin: Hepatitis B Vaccine (Adol/Adult)          IMM MENINGOCOCCAL ACWY VACCINE (Series Information) Aged Out    No completion history exists for this topic.                Patient Care Team:  Dean Pascual M.D. as PCP - General (Family Medicine)  Cleveland Hoover M.D. as PCP - [facility] Paneled    Social History     Tobacco Use   • Smoking status: Never Smoker   • Smokeless tobacco: Never Used    Vaping Use   • Vaping Use: Never used   Substance Use Topics   • Alcohol use: Yes     Alcohol/week: 0.0 oz     Comment: Rarely   • Drug use: No     Family History   Problem Relation Age of Onset   • Cancer Mother 72        colon   • Heart Disease Father 82        CHF   • Heart Disease Maternal Grandmother    • Diabetes Maternal Grandfather    • Diabetes Paternal Grandfather    • Stroke Neg Hx      He  has a past medical history of Arthritis (12/02/2016), Cataract, COVID-19, Heart murmur, Insomnia, Right inguinal hernia, and Umbilical hernia.   Past Surgical History:   Procedure Laterality Date   • INGUINAL HERNIA REPAIR ROBOTIC XI  9/28/2021    Procedure: REPAIR, HERNIA, INGUINAL, ROBOT-ASSISTED, USING DA [employer] XI - RIGHT WITH MESH PLACEMENT;  Surgeon: Dev Coombs M.D.;  Location: SURGERY [facility];  Service: Gen Robotic   • SHOULDER DECOMPRESSION ARTHROSCOPIC Right 12/6/2016    Procedure: SHOULDER DECOMPRESSION ARTHROSCOPIC - SUBACROMIAL;  Surgeon: Clinton Trinh M.D.;  Location: SURGERY [facility];  Service:    • CLAVICLE DISTAL EXCISION Right 12/6/2016    Procedure: CLAVICLE DISTAL EXCISION - PARTIAL AND HANCOCK;  Surgeon: Clinton Trinh M.D.;  Location: SURGERY [facility];  Service:    • COLONOSCOPY  2007    neg   • FINGER EXPLORATION Right 1965    bone graft R middle   • TONSILLECTOMY  1958   • HERNIA REPAIR Bilateral age 7 mo    inguinal           Exam:     BP (!) 98/58 (BP Location: Left arm, Patient Position: Sitting, BP Cuff Size: Adult long)   Pulse (!) 50   Temp 36.4 °C (97.5 °F) (Temporal)   Resp 18   Ht 1.829 m (6')   Wt 81.8 kg (180 lb 5.4 oz)   SpO2 94%  Body mass index is 24.46 kg/m².    Hearing poor.  Has hearing aids.   Dentition unable to assess, wearing mask.   Alert, oriented in no acute distress.  Eye contact is good, speech goal directed, affect calm  Cardio: RRR.   Resp: CTAB.       Assessment and Plan. The following treatment and monitoring plan is recommended:      1.  Healthcare maintenance  - Subsequent Annual Wellness Visit - Includes PPPS ()    2. Stage 3a chronic kidney disease (HCC)  - Comp Metabolic Panel; Future    3. Screening for ischemic heart disease  - Lipid Profile; Future    4. Screening for diabetes mellitus  - Comp Metabolic Panel; Future    5. Need for vaccination  - recommended Shingrix, Prevnar. Patient will consider.     The patient is aware that I am leaving [facility] at the end of July and that he will need to establish with a new PCP.  It has been a real privilege serving as his family doctor.         Services suggested: No services needed at this time  Health Care Screening recommendations as per orders if indicated.  Referrals offered: PT/OT/Nutrition counseling/Behavioral Health/Smoking cessation as per orders if indicated.    Discussion today about general wellness and lifestyle habits:    · Prevent falls and reduce trip hazards; Cautioned about securing or removing rugs.  · Have a working fire alarm and carbon monoxide detector;   · Engage in regular physical activity and social activities       Follow-up: Return if symptoms worsen or fail to improve.

## 2022-07-26 ENCOUNTER — TELEPHONE (OUTPATIENT)
Dept: MEDICAL GROUP | Facility: MEDICAL CENTER | Age: 72
End: 2022-07-26
Payer: MEDICARE

## 2022-07-26 DIAGNOSIS — E16.2 HYPOGLYCEMIA: ICD-10-CM

## 2022-07-26 LAB
ALBUMIN SERPL BCP-MCNC: 4.2 G/DL (ref 3.2–4.9)
ALBUMIN/GLOB SERPL: 1.6 G/DL
ALP SERPL-CCNC: 50 U/L (ref 30–99)
ALT SERPL-CCNC: 29 U/L (ref 2–50)
ANION GAP SERPL CALC-SCNC: 11 MMOL/L (ref 7–16)
AST SERPL-CCNC: 32 U/L (ref 12–45)
BILIRUB SERPL-MCNC: 0.6 MG/DL (ref 0.1–1.5)
BUN SERPL-MCNC: 30 MG/DL (ref 8–22)
CALCIUM SERPL-MCNC: 9.5 MG/DL (ref 8.5–10.5)
CHLORIDE SERPL-SCNC: 105 MMOL/L (ref 96–112)
CHOLEST SERPL-MCNC: 137 MG/DL (ref 100–199)
CO2 SERPL-SCNC: 25 MMOL/L (ref 20–33)
CREAT SERPL-MCNC: 1.3 MG/DL (ref 0.5–1.4)
FASTING STATUS PATIENT QL REPORTED: NORMAL
GFR SERPLBLD CREATININE-BSD FMLA CKD-EPI: 58 ML/MIN/1.73 M 2
GLOBULIN SER CALC-MCNC: 2.7 G/DL (ref 1.9–3.5)
GLUCOSE SERPL-MCNC: 62 MG/DL (ref 65–99)
HDLC SERPL-MCNC: 36 MG/DL
LDLC SERPL CALC-MCNC: 81 MG/DL
POTASSIUM SERPL-SCNC: 4.5 MMOL/L (ref 3.6–5.5)
PROT SERPL-MCNC: 6.9 G/DL (ref 6–8.2)
SODIUM SERPL-SCNC: 141 MMOL/L (ref 135–145)
TRIGL SERPL-MCNC: 102 MG/DL (ref 0–149)

## 2022-07-26 NOTE — TELEPHONE ENCOUNTER
Phone Number Called: [phone]    Call outcome: Left detailed message for patient. Informed to call back with any additional questions.    Message: Patient would like to schedule an appointment to establish care with Dr. Fairchild. Called patient and LVM with call back number to schedule appointment.

## 2022-07-28 ENCOUNTER — HOSPITAL ENCOUNTER (OUTPATIENT)
Dept: LAB | Facility: MEDICAL CENTER | Age: 72
End: 2022-07-28
Attending: FAMILY MEDICINE
Payer: MEDICARE

## 2022-07-28 DIAGNOSIS — E16.2 HYPOGLYCEMIA: ICD-10-CM

## 2022-07-28 LAB — GLUCOSE SERPL-MCNC: 84 MG/DL (ref 65–99)

## 2022-07-28 PROCEDURE — 82947 ASSAY GLUCOSE BLOOD QUANT: CPT

## 2022-07-28 PROCEDURE — 36415 COLL VENOUS BLD VENIPUNCTURE: CPT

## 2022-08-02 ENCOUNTER — TELEPHONE (OUTPATIENT)
Dept: MEDICAL GROUP | Facility: MEDICAL CENTER | Age: 72
End: 2022-08-02
Payer: MEDICARE

## 2022-08-02 NOTE — TELEPHONE ENCOUNTER
NEW PATIENT VISIT PRE-VISIT PLANNING    Patient WAS NOT contacted to complete the New Patient Pre-Visit Planning Note. NU2U patient. Information was reviewed in the chart.     1.  [facility] Patient Demographics?Yes    2.  Immunizations were updated in Epic using Reconcile Outside Information activity? Yes. Immunizations queried through [employer] and reconciled from outside sources. Immunization records are up to date.          3.  Is this appointment scheduled as a Hospital Follow-Up? No    4.  Patient is due for the following Health Maintenance Topics:   Health Maintenance Due   Topic Date Due   • HEPATITIS C SCREENING  Never done   • COVID-19 Vaccine (1) Never done   • IMM ZOSTER VACCINES (1 of 2) Never done   • IMM PNEUMOCOCCAL VACCINE: 65+ Years (1 - PCV) Never done     PLEASE NOTE: [facility] is no longer giving Shingle/Zoster vaccines as of December 31, 2021. Patients are advised to complete this vaccine at a local pharmacy.     5.  Reviewed/Updated the following:  · Preferred Pharmacy? Yes  · Preferred Lab? Yes  · Preferred Communication? Yes  · Allergies? Yes; no new allergies to reconcile from outside sources.  · Medications? YES. Was Abstract Encounter opened and chart updated? NO; no new medications to reconcile from outside sources.   · Social History? Yes  · Family History (document living status of immediate family members and if + hx of cancer, diabetes, hypertension, hyperlipidemia, heart attack, stroke) No    6.  Updated Care Team?  · DME Company (gait device, O2, CPAP, etc.) N\A  · Other Specialists (eye doctor, derm, GYN, cardiology, endo, etc): N\A      This Pre-Visit Planning Note was created for the Provider and Medical Assistant to review prior to the patient's upcoming office visit. The patient IS NOT REQUIRED to follow up on this note.

## 2022-08-05 ENCOUNTER — OFFICE VISIT (OUTPATIENT)
Dept: MEDICAL GROUP | Facility: MEDICAL CENTER | Age: 72
End: 2022-08-05
Payer: MEDICARE

## 2022-08-05 VITALS
OXYGEN SATURATION: 97 % | HEIGHT: 72 IN | HEART RATE: 47 BPM | RESPIRATION RATE: 16 BRPM | SYSTOLIC BLOOD PRESSURE: 100 MMHG | BODY MASS INDEX: 24.9 KG/M2 | TEMPERATURE: 97.4 F | DIASTOLIC BLOOD PRESSURE: 58 MMHG | WEIGHT: 183.86 LBS

## 2022-08-05 DIAGNOSIS — Z13.79 GENETIC SCREENING: ICD-10-CM

## 2022-08-05 DIAGNOSIS — E78.00 PURE HYPERCHOLESTEROLEMIA: ICD-10-CM

## 2022-08-05 DIAGNOSIS — Z80.0 FAMILY HISTORY OF COLON CANCER IN MOTHER: ICD-10-CM

## 2022-08-05 DIAGNOSIS — N18.31 STAGE 3A CHRONIC KIDNEY DISEASE: ICD-10-CM

## 2022-08-05 DIAGNOSIS — Z76.89 ENCOUNTER TO ESTABLISH CARE WITH NEW DOCTOR: ICD-10-CM

## 2022-08-05 DIAGNOSIS — Z00.00 HEALTHCARE MAINTENANCE: ICD-10-CM

## 2022-08-05 DIAGNOSIS — R53.82 CHRONIC FATIGUE: ICD-10-CM

## 2022-08-05 PROCEDURE — 99214 OFFICE O/P EST MOD 30 MIN: CPT | Performed by: STUDENT IN AN ORGANIZED HEALTH CARE EDUCATION/TRAINING PROGRAM

## 2022-08-05 ASSESSMENT — FIBROSIS 4 INDEX: FIB4 SCORE: 1.78

## 2022-08-05 NOTE — PROGRESS NOTES
Subjective:     CC:  Diagnoses of Chronic fatigue, Stage 3a chronic kidney disease (HCC), Pure hypercholesterolemia, Family history of colon cancer in mother, Healthcare maintenance, Genetic screening, and Encounter to establish care with new doctor were pertinent to this visit.    HISTORY OF THE PRESENT ILLNESS: Patient is a 71 y.o. male. This pleasant patient is here today to establish care and discuss chronic conditions. His prior PCP was Dr. Pascual.    Problem   Family History of Colon Cancer in Mother    He reports mother had colon cancer in her 70s.     Chronic Fatigue    Chronic condition for several years. He feels lack of energy throughout the day.     Pure Hypercholesterolemia    Chronic condition. Has been well managed with healthy lifestyle.     Healthcare Maintenance    Patient is here to establish care today. Feels well overall.    Health Maintenance: reviewed  Vaccines: due for PCV, Shingrix, COVID-19, Tdap 01/2015  Colonoscopy 05/2017 normal, on 10-yr recall    Discussed and offered the no cost genetic screening through [employer] [facility] [employer] [facility]. Patient is interested in participating.     Stage 3 Chronic Kidney Disease (Hcc)    Chronic condition. He tries to maintain adequate hydration 1-2L daily. He avoids regular NSAIDs.         Current Outpatient Medications Ordered in Epic   Medication Sig Dispense Refill   • DOXYLAMINE-DM PO Take 25 mg by mouth.     • ASPIRIN ADULT PO Take 400 mg by mouth.     • Melatonin CR 3 MG Tab CR 1 tablet PO nightly 90 Tab 3   • ibuprofen (MOTRIN) 600 MG Tab Take 600 mg by mouth every 6 hours as needed.       No current Epic-ordered facility-administered medications on file.     Social history  Living situation: lives with wife at home  Occupation: retired  Marital status:   Alcohol/tobacco/illicit drugs: never smoking, a beer a week, denies illicit drugs  Diet/Exercise: Eats healthy/fair diet in general. Regular exercise with walking 3-8 miles daily.  Baseline  functional status: Independent with ADLs.    Health Maintenance: reviewed  Vaccines: due for PCV, Shingrix, COVID-19, Tdap 01/2015  Colonoscopy 05/2017 normal, on 10-yr recall    ROS:   Gen: no fevers/chills, no changes in weight, + fatigue  Eyes: no changes in vision  ENT: no sore throat  Pulm: no sob, no cough  CV: no chest pain, no palpitations  GI: no nausea/vomiting, no diarrhea  : no dysuria  MSk: no myalgias  Skin: no rash  Neuro: no headaches, no numbness/tingling  Heme/Lymph: no easy bruising      Objective:     Exam: /58 (BP Location: Left arm, Patient Position: Sitting, BP Cuff Size: Adult)   Pulse (!) 47   Temp 36.3 °C (97.4 °F) (Temporal)   Resp 16   Ht 1.829 m (6')   Wt 83.4 kg (183 lb 13.8 oz)   SpO2 97%  Body mass index is 24.94 kg/m².    General: Normal appearing. No distress.  HEENT: Normocephalic. Nasal mucosa benign, oropharynx is without erythema, edema or exudates.   Neck: Supple without JVD or bruit. Thyroid is not enlarged.  Pulmonary: Clear to ausculation.  Normal effort. No rales, ronchi, or wheezing.  Cardiovascular: Regular rate and rhythm without murmur. Carotid and radial pulses are intact and equal bilaterally.  Abdomen: Soft, nontender, nondistended. Normal bowel sounds.  Neurologic: Grossly nonfocal  Skin: Warm and dry.  No obvious lesions.  Musculoskeletal: Normal gait. No extremity cyanosis, clubbing, or edema.  Psych: Normal mood and affect. Alert and oriented x3. Judgment and insight is normal.    Labs:   Lab Results   Component Value Date/Time    SODIUM 141 07/25/2022 03:15 PM    POTASSIUM 4.5 07/25/2022 03:15 PM    CHLORIDE 105 07/25/2022 03:15 PM    CO2 25 07/25/2022 03:15 PM    ANION 11.0 07/25/2022 03:15 PM    GLUCOSE 84 07/28/2022 09:35 AM    BUN 30 (H) 07/25/2022 03:15 PM    CREATININE 1.30 07/25/2022 03:15 PM    CALCIUM 9.5 07/25/2022 03:15 PM    ASTSGOT 32 07/25/2022 03:15 PM    ALTSGPT 29 07/25/2022 03:15 PM    TBILIRUBIN 0.6 07/25/2022 03:15 PM    ALBUMIN  4.2 07/25/2022 03:15 PM    TOTPROTEIN 6.9 07/25/2022 03:15 PM    GLOBULIN 2.7 07/25/2022 03:15 PM    AGRATIO 1.6 07/25/2022 03:15 PM     Lab Results   Component Value Date/Time    CHOLSTRLTOT 137 07/25/2022 1515    TRIGLYCERIDE 102 07/25/2022 1515    HDL 36 (A) 07/25/2022 1515    LDL 81 07/25/2022 1515       Assessment & Plan:   71 y.o. male with the following -    1. Chronic fatigue  Chronic condition, persistent. Plan to evaluate with labs per orders to rule out organic etiologies.  - TSH WITH REFLEX TO FT4; Future  - VITAMIN D,25 HYDROXY; Future  - VITAMIN B12; Future  - FOLATE; Future    2. Stage 3a chronic kidney disease (HCC)  Chronic condition, stable.  - advised adequate fluid intake (2L or more/day), low sodium diet, moderately low protein diet, and to avoid NSAIDs   - Comp Metabolic Panel; Future  - IRON/TOTAL IRON BIND; Future  - FERRITIN; Future    3. Pure hypercholesterolemia  Chronic condition, stable. Plan to reassess.  - Lipid Profile; Future    4. Family history of colon cancer in mother  - COLOGUARD COLON CANCER SCREENING    5. Healthcare maintenance  - recommended age appropriate vaccinations and cancer screening  - follow up 1 year for annual physical    6. Genetic screening  - Referral to Genetic Research Studies    7. Encounter to establish care with new doctor        Return in about 1 year (around 8/5/2023) for annual physical.    Please note that this dictation was created using voice recognition software. I have made every reasonable attempt to correct obvious errors, but I expect that there are errors of grammar and possibly content that I did not discover before finalizing the note.

## 2022-08-08 ENCOUNTER — RESEARCH ENCOUNTER (OUTPATIENT)
Dept: RESEARCH | Facility: WORKSITE | Age: 72
End: 2022-08-08
Payer: MEDICARE

## 2022-08-18 ENCOUNTER — RESEARCH ENCOUNTER (OUTPATIENT)
Dept: MEDICAL GROUP | Facility: PHYSICIAN GROUP | Age: 72
End: 2022-08-18
Payer: MEDICARE

## 2022-08-18 DIAGNOSIS — Z00.6 RESEARCH STUDY PATIENT: ICD-10-CM

## 2022-08-31 ENCOUNTER — TELEPHONE (OUTPATIENT)
Dept: HEALTH INFORMATION MANAGEMENT | Facility: OTHER | Age: 72
End: 2022-08-31

## 2022-09-22 ENCOUNTER — RESEARCH ENCOUNTER (OUTPATIENT)
Dept: MEDICAL GROUP | Facility: PHYSICIAN GROUP | Age: 72
End: 2022-09-22
Payer: MEDICARE

## 2022-09-22 DIAGNOSIS — Z00.6 RESEARCH STUDY PATIENT: ICD-10-CM

## 2022-11-03 NOTE — PREPROCEDURE INSTRUCTIONS
"Preadmit appointment: \" Preparing for your Procedure information\" sheet given to patient with verbal and written instructions. Patient instructed to continue prescribed medications through the day before surgery, instructed to take the following medications the day of surgery per anesthesia protocol: none.  Pt states, \"no issues with anesthesia\".  All Pt's questions and concerns answered.  "
Constitutional: no fever, chills, no recent weight loss, change in appetite or malaise  MS: no pain to back or extremities, no loss of ROM, no weakness  Neuro: No headache or weakness. No LOC.  Skin: see HPI  Endocrine: No history of thyroid disease or diabetes.

## 2022-11-11 ENCOUNTER — DOCUMENTATION (OUTPATIENT)
Dept: HEALTH INFORMATION MANAGEMENT | Facility: OTHER | Age: 72
End: 2022-11-11
Payer: MEDICARE

## 2022-12-30 LAB
APOB+LDLR+PCSK9 GENE MUT ANL BLD/T: NOT DETECTED
BRCA1+BRCA2 DEL+DUP + FULL MUT ANL BLD/T: NOT DETECTED
MLH1+MSH2+MSH6+PMS2 GN DEL+DUP+FUL M: NOT DETECTED

## (undated) DEVICE — SUCTION INSTRUMENT YANKAUER BULBOUS TIP W/O VENT (50EA/CA)

## (undated) DEVICE — GLOVE BIOGEL PI INDICATOR SZ 7.5 SURGICAL PF LF -(50/BX 4BX/CA)

## (undated) DEVICE — GLOVE BIOGEL PI ULTRATOUCH SZ 8.0 SURGICAL PF LF - (50/BX 4BX/CA)

## (undated) DEVICE — GLOVE 7.0 LF PF PROTEXIS (50PR/BX)

## (undated) DEVICE — NEEDLE INSFL 120MM 14GA VRRS - (20/BX)

## (undated) DEVICE — SHEARS MONOPOLAR CURVED  DA VINCI 10X'S REUSABLE

## (undated) DEVICE — GLOVE BIOGEL PI INDICATOR SZ 7.0 SURGICAL PF LF - (50/BX 4BX/CA)

## (undated) DEVICE — COVER TIP ENDOWRIST HOT SHEAR - (10EA/BX) DA VINCI

## (undated) DEVICE — SET EXTENSION WITH 2 PORTS (48EA/CA) ***PART #2C8610 IS A SUBSTITUTE*****

## (undated) DEVICE — Device

## (undated) DEVICE — LACTATED RINGERS INJ 1000 ML - (14EA/CA 60CA/PF)

## (undated) DEVICE — TOWEL STOP TIMEOUT SAFETY FLAG (40EA/CA)

## (undated) DEVICE — WATER IRRIGATION STERILE 1000ML (12EA/CA)

## (undated) DEVICE — GOWN WARMING STANDARD FLEX - (30/CA)

## (undated) DEVICE — BIPOLAR FORCE DA VINCI 12X'S REISABLE

## (undated) DEVICE — SUTURE GENERAL

## (undated) DEVICE — OBTURATOR BLADELESS STANDARD 8MM (6EA/BX)

## (undated) DEVICE — ADHESIVE DERMABOND HVD MINI (12EA/BX)

## (undated) DEVICE — NEEDLE DRIVER MEGA SUTURECUT DA VINCI 15X'S REUSABLE

## (undated) DEVICE — SUTURE 2-0 30CM STRATAFIX SPIRAL PDO ***WAS PART #SXPD1B401 *****

## (undated) DEVICE — PROTECTOR ULNA NERVE - (36PR/CA)

## (undated) DEVICE — TUBE CONNECT SUCTION CLEAR 120 X 1/4" (50EA/CA)"

## (undated) DEVICE — SEAL 5MM-8MM UNIVERSAL  BOX OF 10

## (undated) DEVICE — ELECTRODE 850 FOAM ADHESIVE - HYDROGEL RADIOTRNSPRNT (50/PK)

## (undated) DEVICE — SYSTEM CLEARIFY VISUALIZATION (10EA/PK)

## (undated) DEVICE — SENSOR SPO2 NEO LNCS ADHESIVE (20/BX) SEE USER NOTES

## (undated) DEVICE — MASK ANESTHESIA ADULT  - (100/CA)

## (undated) DEVICE — HEAD HOLDER JUNIOR/ADULT

## (undated) DEVICE — KIT ANESTHESIA W/CIRCUIT & 3/LT BAG W/FILTER (20EA/CA)

## (undated) DEVICE — REDUCER XI STAPLER 12MM TO 8MM (6EA/BX)

## (undated) DEVICE — DRAPE ARM  BOX OF 20

## (undated) DEVICE — SLEEVE, VASO, THIGH, MED

## (undated) DEVICE — TUBING CLEARLINK DUO-VENT - C-FLO (48EA/CA)

## (undated) DEVICE — SUTURE 4-0 MONOCRYL PLUS PS-2 - 27 INCH (36/BX)

## (undated) DEVICE — GLOVE BIOGEL SZ 7 SURGICAL PF LTX - (50PR/BX 4BX/CA)

## (undated) DEVICE — CHLORAPREP 26 ML APPLICATOR - ORANGE TINT(25/CA)

## (undated) DEVICE — ELECTRODE DUAL RETURN W/ CORD - (50/PK)

## (undated) DEVICE — CANISTER SUCTION 3000ML MECHANICAL FILTER AUTO SHUTOFF MEDI-VAC NONSTERILE LF DISP  (40EA/CA)

## (undated) DEVICE — SODIUM CHL IRRIGATION 0.9% 1000ML (12EA/CA)

## (undated) DEVICE — DRAPE COLUMN  BOX OF 20

## (undated) DEVICE — GLOVE BIOGEL PI ORTHO SZ 8 PF LF (40PR/BX)

## (undated) DEVICE — ROBOTIC SURGERY SERVICES

## (undated) DEVICE — SET LEADWIRE 5 LEAD BEDSIDE DISPOSABLE ECG (1SET OF 5/EA)

## (undated) DEVICE — TUBE E-T HI-LO CUFF 7.5MM (10EA/PK)

## (undated) DEVICE — SEAL CANNULA STAPLER 12 MM (10EA/BX)